# Patient Record
Sex: MALE | Race: WHITE | NOT HISPANIC OR LATINO | Employment: OTHER | URBAN - METROPOLITAN AREA
[De-identification: names, ages, dates, MRNs, and addresses within clinical notes are randomized per-mention and may not be internally consistent; named-entity substitution may affect disease eponyms.]

---

## 2023-06-27 LAB
CELLS COUNTED TOTAL (#) IN BODY FLUID: 100
CLARITY FLUID: NORMAL
COLOR OF BODY FLUID: NORMAL
ERYTHROCYTES (/UL) IN BODY FLUID: NORMAL /UL
JOINT FLUID CRYSTALS: NORMAL
LEUKOCYTES (/UL) IN BODY FLUID: 663 /UL
LYMPHOCYTES/100 LEUKOCYTES IN BODY FLUID BY MAN CT: 10 %
MONOCYTES+MACROPHAGES/100 WBC IN BODY FLUID BY MAN CT: 1 %
NEUTROPHILS/100 LEUKOCYTES IN BODY FLUID BY MANUAL COUNT: 89 %

## 2023-07-01 LAB
GRAM STAIN: NORMAL
STERILE FLUID CULTURE/SMEAR: NORMAL

## 2025-07-13 ENCOUNTER — APPOINTMENT (OUTPATIENT)
Dept: RADIOLOGY | Facility: HOSPITAL | Age: 85
DRG: 069 | End: 2025-07-13
Payer: MEDICARE

## 2025-07-13 ENCOUNTER — APPOINTMENT (OUTPATIENT)
Dept: CARDIOLOGY | Facility: HOSPITAL | Age: 85
DRG: 069 | End: 2025-07-13
Payer: MEDICARE

## 2025-07-13 ENCOUNTER — HOSPITAL ENCOUNTER (INPATIENT)
Facility: HOSPITAL | Age: 85
LOS: 5 days | Discharge: HOME | DRG: 069 | End: 2025-07-18
Attending: EMERGENCY MEDICINE | Admitting: INTERNAL MEDICINE
Payer: MEDICARE

## 2025-07-13 DIAGNOSIS — I63.89 OTHER CEREBRAL INFARCTION: ICD-10-CM

## 2025-07-13 DIAGNOSIS — I63.211 CEREBROVASCULAR ACCIDENT (CVA) DUE TO STENOSIS OF RIGHT VERTEBRAL ARTERY (MULTI): ICD-10-CM

## 2025-07-13 DIAGNOSIS — I16.9 HYPERTENSIVE CRISIS: ICD-10-CM

## 2025-07-13 DIAGNOSIS — I82.90 THROMBUS: ICD-10-CM

## 2025-07-13 DIAGNOSIS — I63.212: ICD-10-CM

## 2025-07-13 DIAGNOSIS — E87.6 HYPOKALEMIA: ICD-10-CM

## 2025-07-13 DIAGNOSIS — Z91.81 AT RISK FOR FALLING: ICD-10-CM

## 2025-07-13 DIAGNOSIS — G45.9 TIA (TRANSIENT ISCHEMIC ATTACK): Primary | ICD-10-CM

## 2025-07-13 DIAGNOSIS — R42 DIZZINESS AND GIDDINESS: ICD-10-CM

## 2025-07-13 DIAGNOSIS — I67.89 OTHER CEREBROVASCULAR DISEASE: ICD-10-CM

## 2025-07-13 PROBLEM — E78.2 MIXED HYPERLIPIDEMIA: Status: ACTIVE | Noted: 2017-10-06

## 2025-07-13 PROBLEM — E78.5 DYSLIPIDEMIA: Status: ACTIVE | Noted: 2025-07-13

## 2025-07-13 PROBLEM — K21.9 GERD (GASTROESOPHAGEAL REFLUX DISEASE): Status: ACTIVE | Noted: 2025-07-13

## 2025-07-13 PROBLEM — R09.89 LABILE HYPERTENSION: Status: ACTIVE | Noted: 2025-07-13

## 2025-07-13 PROBLEM — I77.71 DISSECTION OF CAROTID ARTERY (MULTI): Status: ACTIVE | Noted: 2025-07-13

## 2025-07-13 PROBLEM — R73.03 PREDIABETES: Status: ACTIVE | Noted: 2025-07-13

## 2025-07-13 PROBLEM — I10 HYPERTENSION: Status: ACTIVE | Noted: 2025-07-13

## 2025-07-13 PROBLEM — I82.409 DEEP VEIN THROMBOSIS (DVT) (MULTI): Status: ACTIVE | Noted: 2025-07-13

## 2025-07-13 LAB
ALBUMIN SERPL BCP-MCNC: 4.1 G/DL (ref 3.4–5)
ALBUMIN SERPL BCP-MCNC: 4.2 G/DL (ref 3.4–5)
ALP SERPL-CCNC: 48 U/L (ref 33–136)
ALP SERPL-CCNC: 50 U/L (ref 33–136)
ALT SERPL W P-5'-P-CCNC: 13 U/L (ref 10–52)
ALT SERPL W P-5'-P-CCNC: 13 U/L (ref 10–52)
ANION GAP SERPL CALC-SCNC: 13 MMOL/L (ref 10–20)
ANION GAP SERPL CALC-SCNC: 13 MMOL/L (ref 10–20)
APTT PPP: 36 SECONDS (ref 26–36)
AST SERPL W P-5'-P-CCNC: 15 U/L (ref 9–39)
AST SERPL W P-5'-P-CCNC: 16 U/L (ref 9–39)
BASOPHILS # BLD AUTO: 0.06 X10*3/UL (ref 0–0.1)
BASOPHILS NFR BLD AUTO: 0.8 %
BILIRUB SERPL-MCNC: 0.4 MG/DL (ref 0–1.2)
BILIRUB SERPL-MCNC: 0.5 MG/DL (ref 0–1.2)
BNP SERPL-MCNC: 75 PG/ML (ref 0–99)
BUN SERPL-MCNC: 22 MG/DL (ref 6–23)
BUN SERPL-MCNC: 23 MG/DL (ref 6–23)
CALCIUM SERPL-MCNC: 8.4 MG/DL (ref 8.6–10.3)
CALCIUM SERPL-MCNC: 8.5 MG/DL (ref 8.6–10.3)
CARDIAC TROPONIN I PNL SERPL HS: 17 NG/L (ref 0–20)
CHLORIDE SERPL-SCNC: 107 MMOL/L (ref 98–107)
CHLORIDE SERPL-SCNC: 107 MMOL/L (ref 98–107)
CHOLEST SERPL-MCNC: 144 MG/DL (ref 0–199)
CHOLESTEROL/HDL RATIO: 4.1
CO2 SERPL-SCNC: 25 MMOL/L (ref 21–32)
CO2 SERPL-SCNC: 25 MMOL/L (ref 21–32)
CREAT SERPL-MCNC: 1.12 MG/DL (ref 0.5–1.3)
CREAT SERPL-MCNC: 1.18 MG/DL (ref 0.5–1.3)
EGFRCR SERPLBLD CKD-EPI 2021: 61 ML/MIN/1.73M*2
EGFRCR SERPLBLD CKD-EPI 2021: 65 ML/MIN/1.73M*2
EOSINOPHIL # BLD AUTO: 0.31 X10*3/UL (ref 0–0.4)
EOSINOPHIL NFR BLD AUTO: 4 %
ERYTHROCYTE [DISTWIDTH] IN BLOOD BY AUTOMATED COUNT: 13 % (ref 11.5–14.5)
EST. AVERAGE GLUCOSE BLD GHB EST-MCNC: 134 MG/DL
GLUCOSE BLD MANUAL STRIP-MCNC: 113 MG/DL (ref 74–99)
GLUCOSE BLD MANUAL STRIP-MCNC: 130 MG/DL (ref 74–99)
GLUCOSE SERPL-MCNC: 110 MG/DL (ref 74–99)
GLUCOSE SERPL-MCNC: 136 MG/DL (ref 74–99)
HBA1C MFR BLD: 6.3 % (ref ?–5.7)
HCT VFR BLD AUTO: 43.2 % (ref 41–52)
HDLC SERPL-MCNC: 34.7 MG/DL
HGB BLD-MCNC: 14.1 G/DL (ref 13.5–17.5)
IMM GRANULOCYTES # BLD AUTO: 0.03 X10*3/UL (ref 0–0.5)
IMM GRANULOCYTES NFR BLD AUTO: 0.4 % (ref 0–0.9)
INR PPP: 2.2 (ref 0.9–1.1)
LDLC SERPL CALC-MCNC: 88 MG/DL
LYMPHOCYTES # BLD AUTO: 2.35 X10*3/UL (ref 0.8–3)
LYMPHOCYTES NFR BLD AUTO: 30.3 %
MCH RBC QN AUTO: 29.6 PG (ref 26–34)
MCHC RBC AUTO-ENTMCNC: 32.6 G/DL (ref 32–36)
MCV RBC AUTO: 91 FL (ref 80–100)
MONOCYTES # BLD AUTO: 0.84 X10*3/UL (ref 0.05–0.8)
MONOCYTES NFR BLD AUTO: 10.8 %
NEUTROPHILS # BLD AUTO: 4.17 X10*3/UL (ref 1.6–5.5)
NEUTROPHILS NFR BLD AUTO: 53.7 %
NON HDL CHOLESTEROL: 109 MG/DL (ref 0–149)
NRBC BLD-RTO: 0 /100 WBCS (ref 0–0)
PHOSPHATE SERPL-MCNC: 2.5 MG/DL (ref 2.5–4.9)
PLATELET # BLD AUTO: 203 X10*3/UL (ref 150–450)
POTASSIUM SERPL-SCNC: 3.3 MMOL/L (ref 3.5–5.3)
POTASSIUM SERPL-SCNC: 3.7 MMOL/L (ref 3.5–5.3)
PROT SERPL-MCNC: 6.6 G/DL (ref 6.4–8.2)
PROT SERPL-MCNC: 6.8 G/DL (ref 6.4–8.2)
PROTHROMBIN TIME: 24.8 SECONDS (ref 9.8–12.4)
RBC # BLD AUTO: 4.76 X10*6/UL (ref 4.5–5.9)
SODIUM SERPL-SCNC: 141 MMOL/L (ref 136–145)
SODIUM SERPL-SCNC: 142 MMOL/L (ref 136–145)
TRIGL SERPL-MCNC: 105 MG/DL (ref 0–149)
VLDL: 21 MG/DL (ref 0–40)
WBC # BLD AUTO: 7.8 X10*3/UL (ref 4.4–11.3)

## 2025-07-13 PROCEDURE — 2500000001 HC RX 250 WO HCPCS SELF ADMINISTERED DRUGS (ALT 637 FOR MEDICARE OP)

## 2025-07-13 PROCEDURE — 80053 COMPREHEN METABOLIC PANEL: CPT | Performed by: EMERGENCY MEDICINE

## 2025-07-13 PROCEDURE — 83880 ASSAY OF NATRIURETIC PEPTIDE: CPT

## 2025-07-13 PROCEDURE — 70496 CT ANGIOGRAPHY HEAD: CPT | Performed by: RADIOLOGY

## 2025-07-13 PROCEDURE — 85730 THROMBOPLASTIN TIME PARTIAL: CPT | Performed by: EMERGENCY MEDICINE

## 2025-07-13 PROCEDURE — 70450 CT HEAD/BRAIN W/O DYE: CPT

## 2025-07-13 PROCEDURE — 96374 THER/PROPH/DIAG INJ IV PUSH: CPT

## 2025-07-13 PROCEDURE — 2550000001 HC RX 255 CONTRASTS: Performed by: EMERGENCY MEDICINE

## 2025-07-13 PROCEDURE — 70498 CT ANGIOGRAPHY NECK: CPT

## 2025-07-13 PROCEDURE — 85025 COMPLETE CBC W/AUTO DIFF WBC: CPT | Performed by: EMERGENCY MEDICINE

## 2025-07-13 PROCEDURE — 93005 ELECTROCARDIOGRAM TRACING: CPT

## 2025-07-13 PROCEDURE — 2500000004 HC RX 250 GENERAL PHARMACY W/ HCPCS (ALT 636 FOR OP/ED): Mod: JW | Performed by: EMERGENCY MEDICINE

## 2025-07-13 PROCEDURE — 84484 ASSAY OF TROPONIN QUANT: CPT | Performed by: EMERGENCY MEDICINE

## 2025-07-13 PROCEDURE — 71045 X-RAY EXAM CHEST 1 VIEW: CPT

## 2025-07-13 PROCEDURE — 36415 COLL VENOUS BLD VENIPUNCTURE: CPT

## 2025-07-13 PROCEDURE — 70498 CT ANGIOGRAPHY NECK: CPT | Performed by: RADIOLOGY

## 2025-07-13 PROCEDURE — 82947 ASSAY GLUCOSE BLOOD QUANT: CPT

## 2025-07-13 PROCEDURE — 83036 HEMOGLOBIN GLYCOSYLATED A1C: CPT | Mod: STJLAB

## 2025-07-13 PROCEDURE — 70450 CT HEAD/BRAIN W/O DYE: CPT | Performed by: RADIOLOGY

## 2025-07-13 PROCEDURE — 99285 EMERGENCY DEPT VISIT HI MDM: CPT | Mod: 25 | Performed by: EMERGENCY MEDICINE

## 2025-07-13 PROCEDURE — 71045 X-RAY EXAM CHEST 1 VIEW: CPT | Performed by: RADIOLOGY

## 2025-07-13 PROCEDURE — 99291 CRITICAL CARE FIRST HOUR: CPT | Performed by: EMERGENCY MEDICINE

## 2025-07-13 PROCEDURE — 85610 PROTHROMBIN TIME: CPT | Performed by: EMERGENCY MEDICINE

## 2025-07-13 PROCEDURE — 84100 ASSAY OF PHOSPHORUS: CPT

## 2025-07-13 PROCEDURE — 80061 LIPID PANEL: CPT

## 2025-07-13 PROCEDURE — 2500000001 HC RX 250 WO HCPCS SELF ADMINISTERED DRUGS (ALT 637 FOR MEDICARE OP): Performed by: EMERGENCY MEDICINE

## 2025-07-13 PROCEDURE — 2500000002 HC RX 250 W HCPCS SELF ADMINISTERED DRUGS (ALT 637 FOR MEDICARE OP, ALT 636 FOR OP/ED)

## 2025-07-13 PROCEDURE — 36415 COLL VENOUS BLD VENIPUNCTURE: CPT | Performed by: EMERGENCY MEDICINE

## 2025-07-13 PROCEDURE — G0427 INPT/ED TELECONSULT70: HCPCS | Performed by: STUDENT IN AN ORGANIZED HEALTH CARE EDUCATION/TRAINING PROGRAM

## 2025-07-13 PROCEDURE — 2020000001 HC ICU ROOM DAILY

## 2025-07-13 PROCEDURE — 80053 COMPREHEN METABOLIC PANEL: CPT

## 2025-07-13 RX ORDER — POTASSIUM CHLORIDE 20 MEQ/1
20 TABLET, EXTENDED RELEASE ORAL ONCE
Status: DISCONTINUED | OUTPATIENT
Start: 2025-07-13 | End: 2025-07-13

## 2025-07-13 RX ORDER — ATORVASTATIN CALCIUM 80 MG/1
80 TABLET, FILM COATED ORAL NIGHTLY
Status: DISCONTINUED | OUTPATIENT
Start: 2025-07-13 | End: 2025-07-18 | Stop reason: HOSPADM

## 2025-07-13 RX ORDER — NAPROXEN SODIUM 220 MG/1
324 TABLET, FILM COATED ORAL ONCE
Status: COMPLETED | OUTPATIENT
Start: 2025-07-13 | End: 2025-07-13

## 2025-07-13 RX ORDER — CARVEDILOL 6.25 MG/1
6.25 TABLET ORAL 2 TIMES DAILY
COMMUNITY
End: 2025-07-13 | Stop reason: ENTERED-IN-ERROR

## 2025-07-13 RX ORDER — WARFARIN SODIUM 5 MG/1
2.5 TABLET ORAL
COMMUNITY
End: 2025-07-18 | Stop reason: HOSPADM

## 2025-07-13 RX ORDER — HYDRALAZINE HYDROCHLORIDE 25 MG/1
25 TABLET, FILM COATED ORAL 2 TIMES DAILY
COMMUNITY
End: 2025-07-22 | Stop reason: SDUPTHER

## 2025-07-13 RX ORDER — PRAVASTATIN SODIUM 20 MG/1
20 TABLET ORAL NIGHTLY
COMMUNITY
End: 2025-07-18 | Stop reason: HOSPADM

## 2025-07-13 RX ORDER — INSULIN LISPRO 100 [IU]/ML
0-5 INJECTION, SOLUTION INTRAVENOUS; SUBCUTANEOUS EVERY 4 HOURS
Status: DISCONTINUED | OUTPATIENT
Start: 2025-07-13 | End: 2025-07-17

## 2025-07-13 RX ORDER — WARFARIN SODIUM 5 MG/1
5 TABLET ORAL
COMMUNITY
End: 2025-07-18 | Stop reason: HOSPADM

## 2025-07-13 RX ORDER — ASPIRIN 81 MG/1
81 TABLET ORAL NIGHTLY
COMMUNITY

## 2025-07-13 RX ORDER — ASPIRIN 81 MG/1
81 TABLET ORAL NIGHTLY
Status: DISCONTINUED | OUTPATIENT
Start: 2025-07-14 | End: 2025-07-15

## 2025-07-13 RX ORDER — LABETALOL HYDROCHLORIDE 5 MG/ML
10 INJECTION, SOLUTION INTRAVENOUS EVERY 10 MIN PRN
Status: DISCONTINUED | OUTPATIENT
Start: 2025-07-13 | End: 2025-07-13 | Stop reason: SDUPTHER

## 2025-07-13 RX ORDER — POTASSIUM CHLORIDE 1.5 G/1.58G
20 POWDER, FOR SOLUTION ORAL ONCE
Status: COMPLETED | OUTPATIENT
Start: 2025-07-13 | End: 2025-07-13

## 2025-07-13 RX ORDER — ATORVASTATIN CALCIUM 80 MG/1
80 TABLET, FILM COATED ORAL NIGHTLY
Status: DISCONTINUED | OUTPATIENT
Start: 2025-07-13 | End: 2025-07-13

## 2025-07-13 RX ORDER — LABETALOL HYDROCHLORIDE 5 MG/ML
20 INJECTION, SOLUTION INTRAVENOUS ONCE
Status: DISCONTINUED | OUTPATIENT
Start: 2025-07-13 | End: 2025-07-13

## 2025-07-13 RX ORDER — POTASSIUM CHLORIDE 20 MEQ/1
40 TABLET, EXTENDED RELEASE ORAL ONCE
Status: COMPLETED | OUTPATIENT
Start: 2025-07-13 | End: 2025-07-13

## 2025-07-13 RX ORDER — LABETALOL HYDROCHLORIDE 5 MG/ML
10 INJECTION, SOLUTION INTRAVENOUS ONCE
Status: COMPLETED | OUTPATIENT
Start: 2025-07-13 | End: 2025-07-13

## 2025-07-13 RX ORDER — VALSARTAN 320 MG/1
320 TABLET ORAL NIGHTLY
COMMUNITY

## 2025-07-13 RX ORDER — LABETALOL HYDROCHLORIDE 5 MG/ML
10 INJECTION, SOLUTION INTRAVENOUS EVERY 10 MIN PRN
Status: ACTIVE | OUTPATIENT
Start: 2025-07-13 | End: 2025-07-15

## 2025-07-13 RX ADMIN — POTASSIUM CHLORIDE 20 MEQ: 1.5 POWDER, FOR SOLUTION ORAL at 18:42

## 2025-07-13 RX ADMIN — ASPIRIN 81 MG CHEWABLE TABLET 324 MG: 81 TABLET CHEWABLE at 18:42

## 2025-07-13 RX ADMIN — ATORVASTATIN CALCIUM 80 MG: 80 TABLET, FILM COATED ORAL at 21:04

## 2025-07-13 RX ADMIN — LABETALOL HYDROCHLORIDE 10 MG: 5 INJECTION, SOLUTION INTRAVENOUS at 18:32

## 2025-07-13 RX ADMIN — POTASSIUM CHLORIDE EXTENDED-RELEASE 40 MEQ: 1500 TABLET ORAL at 21:04

## 2025-07-13 RX ADMIN — IOHEXOL 60 ML: 350 INJECTION, SOLUTION INTRAVENOUS at 17:38

## 2025-07-13 SDOH — ECONOMIC STABILITY: FOOD INSECURITY: WITHIN THE PAST 12 MONTHS, YOU WORRIED THAT YOUR FOOD WOULD RUN OUT BEFORE YOU GOT THE MONEY TO BUY MORE.: NEVER TRUE

## 2025-07-13 SDOH — ECONOMIC STABILITY: FOOD INSECURITY: WITHIN THE PAST 12 MONTHS, THE FOOD YOU BOUGHT JUST DIDN'T LAST AND YOU DIDN'T HAVE MONEY TO GET MORE.: NEVER TRUE

## 2025-07-13 SDOH — ECONOMIC STABILITY: FOOD INSECURITY: HOW HARD IS IT FOR YOU TO PAY FOR THE VERY BASICS LIKE FOOD, HOUSING, MEDICAL CARE, AND HEATING?: NOT HARD AT ALL

## 2025-07-13 SDOH — ECONOMIC STABILITY: HOUSING INSECURITY: AT ANY TIME IN THE PAST 12 MONTHS, WERE YOU HOMELESS OR LIVING IN A SHELTER (INCLUDING NOW)?: NO

## 2025-07-13 SDOH — ECONOMIC STABILITY: INCOME INSECURITY: IN THE PAST 12 MONTHS HAS THE ELECTRIC, GAS, OIL, OR WATER COMPANY THREATENED TO SHUT OFF SERVICES IN YOUR HOME?: NO

## 2025-07-13 SDOH — ECONOMIC STABILITY: HOUSING INSECURITY: IN THE PAST 12 MONTHS, HOW MANY TIMES HAVE YOU MOVED WHERE YOU WERE LIVING?: 0

## 2025-07-13 SDOH — SOCIAL STABILITY: SOCIAL INSECURITY: WITHIN THE LAST YEAR, HAVE YOU BEEN AFRAID OF YOUR PARTNER OR EX-PARTNER?: NO

## 2025-07-13 SDOH — SOCIAL STABILITY: SOCIAL INSECURITY: DO YOU FEEL UNSAFE GOING BACK TO THE PLACE WHERE YOU ARE LIVING?: NO

## 2025-07-13 SDOH — SOCIAL STABILITY: SOCIAL INSECURITY: ABUSE: ADULT

## 2025-07-13 SDOH — SOCIAL STABILITY: SOCIAL INSECURITY: HAVE YOU HAD THOUGHTS OF HARMING ANYONE ELSE?: NO

## 2025-07-13 SDOH — ECONOMIC STABILITY: HOUSING INSECURITY: IN THE LAST 12 MONTHS, WAS THERE A TIME WHEN YOU WERE NOT ABLE TO PAY THE MORTGAGE OR RENT ON TIME?: NO

## 2025-07-13 SDOH — SOCIAL STABILITY: SOCIAL INSECURITY: WITHIN THE LAST YEAR, HAVE YOU BEEN HUMILIATED OR EMOTIONALLY ABUSED IN OTHER WAYS BY YOUR PARTNER OR EX-PARTNER?: NO

## 2025-07-13 SDOH — SOCIAL STABILITY: SOCIAL INSECURITY: HAS ANYONE EVER THREATENED TO HURT YOUR FAMILY OR YOUR PETS?: NO

## 2025-07-13 SDOH — SOCIAL STABILITY: SOCIAL INSECURITY
WITHIN THE LAST YEAR, HAVE YOU BEEN KICKED, HIT, SLAPPED, OR OTHERWISE PHYSICALLY HURT BY YOUR PARTNER OR EX-PARTNER?: NO

## 2025-07-13 SDOH — SOCIAL STABILITY: SOCIAL INSECURITY
WITHIN THE LAST YEAR, HAVE YOU BEEN RAPED OR FORCED TO HAVE ANY KIND OF SEXUAL ACTIVITY BY YOUR PARTNER OR EX-PARTNER?: NO

## 2025-07-13 SDOH — SOCIAL STABILITY: SOCIAL INSECURITY: DO YOU FEEL ANYONE HAS EXPLOITED OR TAKEN ADVANTAGE OF YOU FINANCIALLY OR OF YOUR PERSONAL PROPERTY?: NO

## 2025-07-13 SDOH — ECONOMIC STABILITY: TRANSPORTATION INSECURITY: IN THE PAST 12 MONTHS, HAS LACK OF TRANSPORTATION KEPT YOU FROM MEDICAL APPOINTMENTS OR FROM GETTING MEDICATIONS?: NO

## 2025-07-13 SDOH — SOCIAL STABILITY: SOCIAL INSECURITY: DOES ANYONE TRY TO KEEP YOU FROM HAVING/CONTACTING OTHER FRIENDS OR DOING THINGS OUTSIDE YOUR HOME?: NO

## 2025-07-13 SDOH — SOCIAL STABILITY: SOCIAL INSECURITY: ARE THERE ANY APPARENT SIGNS OF INJURIES/BEHAVIORS THAT COULD BE RELATED TO ABUSE/NEGLECT?: NO

## 2025-07-13 SDOH — SOCIAL STABILITY: SOCIAL INSECURITY: ARE YOU OR HAVE YOU BEEN THREATENED OR ABUSED PHYSICALLY, EMOTIONALLY, OR SEXUALLY BY ANYONE?: NO

## 2025-07-13 ASSESSMENT — ACTIVITIES OF DAILY LIVING (ADL)
TOILETING: INDEPENDENT
HEARING - LEFT EAR: HEARING AID
BATHING: INDEPENDENT
JUDGMENT_ADEQUATE_SAFELY_COMPLETE_DAILY_ACTIVITIES: YES
FEEDING YOURSELF: INDEPENDENT
HEARING - RIGHT EAR: HEARING AID
WALKS IN HOME: INDEPENDENT
LACK_OF_TRANSPORTATION: NO
ADEQUATE_TO_COMPLETE_ADL: YES
GROOMING: INDEPENDENT
DRESSING YOURSELF: INDEPENDENT
PATIENT'S MEMORY ADEQUATE TO SAFELY COMPLETE DAILY ACTIVITIES?: YES

## 2025-07-13 ASSESSMENT — LIFESTYLE VARIABLES
SKIP TO QUESTIONS 9-10: 1
AUDIT-C TOTAL SCORE: 1
HOW OFTEN DO YOU HAVE 6 OR MORE DRINKS ON ONE OCCASION: NEVER
HOW OFTEN DO YOU HAVE A DRINK CONTAINING ALCOHOL: MONTHLY OR LESS
HOW MANY STANDARD DRINKS CONTAINING ALCOHOL DO YOU HAVE ON A TYPICAL DAY: 1 OR 2
AUDIT-C TOTAL SCORE: 1

## 2025-07-13 ASSESSMENT — PAIN SCALES - GENERAL
PAINLEVEL_OUTOF10: 0 - NO PAIN

## 2025-07-13 ASSESSMENT — COGNITIVE AND FUNCTIONAL STATUS - GENERAL
WALKING IN HOSPITAL ROOM: A LITTLE
TOILETING: A LITTLE
CLIMB 3 TO 5 STEPS WITH RAILING: A LOT
PATIENT BASELINE BEDBOUND: NO
STANDING UP FROM CHAIR USING ARMS: A LITTLE
DAILY ACTIVITIY SCORE: 23
MOBILITY SCORE: 20

## 2025-07-13 ASSESSMENT — PAIN - FUNCTIONAL ASSESSMENT
PAIN_FUNCTIONAL_ASSESSMENT: 0-10
PAIN_FUNCTIONAL_ASSESSMENT: 0-10

## 2025-07-13 ASSESSMENT — PATIENT HEALTH QUESTIONNAIRE - PHQ9
SUM OF ALL RESPONSES TO PHQ9 QUESTIONS 1 & 2: 0
1. LITTLE INTEREST OR PLEASURE IN DOING THINGS: NOT AT ALL
2. FEELING DOWN, DEPRESSED OR HOPELESS: NOT AT ALL

## 2025-07-13 NOTE — CONSULTS
"Inpatient consult to Neuro TeleStroke  Consult performed by: Julito Hu MD  Consult ordered by: Lukas Brown DO      Virtual Visit start time: 550 pm    History Of Present Illness:  Historian: Patient, Family, and ED Provider   Radha Boone is a 84 y.o. male presenting with right arm tingling.  Wife reports that he came to her and said he does not feel good and right arm is tingling.  When she checked his blood pressure it was 230s, came to ED for further evaluation.  Last known well: 4:30 PM  Had stroke symptoms resolved at time of presentation: No   Current antiplatelet/anticoagulant use: Coumadin    Prior Functional Status (Modified Laureano Scale):  1 The patient has no significant disability; able to carry out all pre-stroke activities.    Stroke Risk Factors:  Hypertension, Hyperlipidemia, and Previous H/O Ischemic Stroke    Last Recorded Vitals:  Blood pressure (!) 229/104, pulse 69, temperature 36.8 °C (98.2 °F), temperature source Temporal, resp. rate 16, height 1.803 m (5' 11\"), weight 79.5 kg (175 lb 4.3 oz), SpO2 98%.    Physical Exam:  Alert, oriented.  Normal language, mild dysarthria.  Intact peripheral visual fields.  Symmetric face.  Subtle drift in left upper and lower extremities with ataxia    [facility] NIHSS:   NIH Stroke Scale:     1A. Level of Consciousness:  Alert (keenly responsive) (0)    1B. Ask Month and Age:  Both questions right (0)    1C. Blink Eyes & Squeeze Hands:  Performs both tasks (0)    2. Best Gaze:  Normal (0)    3. Visual:  No visual loss (0)    4. Facial Palsy:  Normal symmetry (0)    5A. Motor - Left Arm:  Drift (+1)    5B. Motor - Right Arm:  No drift (0)    6A. Motor - Left Leg:  No drift (0)    6B. Motor - Right Leg:  No drift (0)    7. Limb Ataxia:  Ataxia in 1 limb (+1)    8. Sensory Loss:  Mild-moderate loss (+1)    9. Best Language:  Normal (no aphasia) (0)    10. Dysarthia:  Mild-moderate dysarthria (+1)    11. Extinction and Inattention:  No abnormality (0)    " "NIH Stroke Scale:  4       Relevant Results:  LABS:  No results found for: \"GLUCOSE\", \"INR\"   No results found for this or any previous visit (from the past 24 hours).     CT Head Imaging:  CTH imaging personally reviewed, showed no acute ischemic / hemorrhagic changes     CTA Head and Neck Imaging:  CTA imaging personally reviewed, other findings  High-grade stenosis of right V4 vertebral artery, moderate focal stenosis of mid basilar artery.  Atherosclerosis of bilateral carotid bifurcations without significant stenosis    Diagnosis:  Supect Acute Ischemic Stroke    Assessment/Plan:  84-year-old gentleman with history of?  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  Suspect posterior circulation stroke    IV Thrombolysis IV Thrombolysis Checklist        IV Thrombolysis Given: No; Thrombolysis contraindication reason: Coagulopathy with Platelets <100,000/mm3 OR aPTT >40s OR PT>15s OR INR>1.7            Patient is a candidate for thrombectomy:  yes/no: No; contraindication reason: No evidence of proximal occlusion    Additional Recommendations:  MRI Brain w/o contrast stroke protocol or repeat CTH 24 hours after initial CTH if unable to get MRI.  TTE w/ bubble study.  Please obtain extended cardiac rhythm monitoring with Holter to rule out paroxysmal A fib.  Lipid panel, A1c.  Hold Coumadin until MRI brain without contrast.  It is unclear why patient is on long-term Coumadin,.  Usually short-term anticoagulation is considered for carotid artery thrombus. .  Aspirin 81 Mg.  Lipid Goals: education on healthy diet and statin therapy to maintain or achieve goal LDL-cholesterol < 70mg. Atorvastatin 80mg..  Blood pressure goals: avoid hypotension SBP <100 that could worsen cerebral perfusion. Ischemic stroke- early permissive hypertension SBP < 220 mmHg with cautious inpatient lowering..  Glucose Goals: " early treatment of hyperglycemia to goal glucose 140-180 mg/dl with long-term goal A1c < 7%.  NPO until nurse bedside swallow assessment.  Consider focused stroke order set.  Smoking Cessation and Education.  Assessment for Rehabilitation needs.  Patient and family education on signs and symptoms of stroke, calling 911, healthy strategies for stroke prevention.        Disposition:  Patient will remain at referring facility for further evaluation and management.    Virtual or Telephone Consent    An interactive audio and video telecommunication system which permits real time communications between the patient (at the originating site) and provider (at the distant site) was utilized to provide this telehealth service.   Verbal consent was requested and obtained from Radha Boone on this date, 07/14/25 for a telehealth visit.      Telestroke is covered in shift work. If there are further Neurological questions or concerns please contact your regional neurologist on call during daytime hours or contact the transfer center with an ADT20 order.    Julito Hu MD

## 2025-07-13 NOTE — ED NOTES
1727 Stroke alert one push activation by ANTHONY Brown  Emergency overhead     Kerline Diaz, EMT  07/13/25 1730

## 2025-07-13 NOTE — ED PROVIDER NOTES
EMERGENCY DEPARTMENT ENCOUNTER      Pt Name: Radha Boone  MRN: [id number]  Birthdate 1940  Date of evaluation: 7/13/2025  Provider: Lukas Brown DO    CHIEF COMPLAINT       Chief Complaint   Patient presents with    Extremity Weakness     HISTORY OF PRESENT ILLNESS    Radha Boone is a 84 y.o. year old male who presents to the ER for lightheadedness.  Wife reports that they were watching the trees, he did not feel well so he went back into their home. She does note he did have an abnormal gait. He reported right upper extremity paresthesias, discomfort in the back of his head, started about 1630. Denies chest pain, denies abdominal pain, vomiting, falls.     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      pravastatin (Pravachol) 20 mg tablet Take 1 tablet (20 mg) by mouth once daily at bedtime.      valsartan (Diovan) 320 mg tablet Take 1 tablet (320 mg) by mouth once daily at bedtime.      vit C/E/Zn/coppr/lutein/zeaxan (PRESERVISION AREDS-2 ORAL) Take 1 capsule by mouth 2 times a day.      !! warfarin (Coumadin) 5 mg tablet Take 0.5 tablets (2.5 mg) by mouth once a day on Sunday, Tuesday, Thursday, and Saturday. evening      aspirin 81 mg EC tablet Take 1 tablet (81 mg) by mouth once daily at bedtime.      !! warfarin (Coumadin) 5 mg tablet Take 1 tablet (5 mg) by mouth once a day on Monday, Wednesday, and Friday. evening       !! - Potential duplicate medications found. Please discuss with provider.        SURGICAL HISTORY     Surgical History[2]  ALLERGIES     Amlodipine and Epinephrine-chlorpheniramine  FAMILY HISTORY     Family History[3]  SOCIAL  HISTORY     Social History[4]  PHYSICAL EXAM  (up to 7 for level 4, 8 or more for level 5)     ED Triage Vitals   Temp Pulse Resp BP   -- -- -- --      SpO2 Temp src Heart Rate Source Patient Position   -- -- -- --      BP Location FiO2 (%)     -- --       Physical Exam  Vitals and nursing note reviewed.   Constitutional:       General: He is not in acute distress.     Appearance: Normal appearance. He is not ill-appearing.   HENT:      Head: Normocephalic and atraumatic. No raccoon eyes, Markham's sign, contusion or laceration.      Jaw: No trismus or malocclusion.      Right Ear: External ear normal.      Left Ear: External ear normal.      Mouth/Throat:      Mouth: Mucous membranes are moist.      Pharynx: Oropharynx is clear.   Eyes:      General: No visual field deficit.     Extraocular Movements: Extraocular movements intact.      Pupils: Pupils are equal, round, and reactive to light.   Neck:      Trachea: No tracheal deviation.   Cardiovascular:      Rate and Rhythm: Normal rate and regular rhythm.      Pulses: Normal pulses.   Pulmonary:      Effort: No respiratory distress.      Breath sounds: No wheezing, rhonchi or rales.   Chest:      Chest wall: No tenderness.   Abdominal:      General: Abdomen is flat.      Palpations: Abdomen is soft. There is no mass.      Tenderness: There is abdominal tenderness (LLQ).   Musculoskeletal:         General: No tenderness or signs of injury.      Right lower leg: No edema.      Left lower leg: No edema.   Skin:     Coloration: Skin is not jaundiced or pale.      Findings: No petechiae, rash or wound.   Neurological:      Mental Status: He is alert.      GCS: GCS eye subscore is 4. GCS verbal subscore is 5. GCS motor subscore is 6.      Cranial Nerves: Dysarthria present.      Sensory: Sensation is intact. No sensory deficit.      Motor: Weakness (RLE drift without hit bed) present.        DIAGNOSTIC RESULTS   LABS:  Labs Reviewed   CBC WITH AUTO DIFFERENTIAL - Abnormal        Result Value    WBC 7.8      nRBC 0.0      RBC 4.76      Hemoglobin 14.1      Hematocrit 43.2      MCV 91      MCH 29.6      MCHC 32.6      RDW 13.0      Platelets 203      Neutrophils % 53.7      Immature Granulocytes %, Automated 0.4      Lymphocytes % 30.3      Monocytes % 10.8      Eosinophils % 4.0      Basophils % 0.8      Neutrophils Absolute 4.17      Immature Granulocytes Absolute, Automated 0.03      Lymphocytes Absolute 2.35      Monocytes Absolute 0.84 (*)     Eosinophils Absolute 0.31      Basophils Absolute 0.06     COMPREHENSIVE METABOLIC PANEL - Abnormal    Glucose 110 (*)     Sodium 142      Potassium 3.3 (*)     Chloride 107      Bicarbonate 25      Anion Gap 13      Urea Nitrogen 23      Creatinine 1.18      eGFR 61      Calcium 8.5 (*)     Albumin 4.2      Alkaline Phosphatase 50      Total Protein 6.8      AST 16      Bilirubin, Total 0.4      ALT 13     PROTIME-INR - Abnormal    Protime 24.8 (*)     INR 2.2 (*)    HEMOGLOBIN A1C - Abnormal    Hemoglobin A1C 6.3 (*)     Estimated Average Glucose 134      Narrative:     Diagnosis of Diabetes-Adults  Non-Diabetic: < or = 5.6%  Increased risk for developing diabetes: 5.7-6.4%  Diagnostic of diabetes: > or = 6.5%       COMPREHENSIVE METABOLIC PANEL - Abnormal    Glucose 136 (*)     Sodium 141      Potassium 3.7      Chloride 107      Bicarbonate 25      Anion Gap 13      Urea Nitrogen 22      Creatinine 1.12      eGFR 65      Calcium 8.4 (*)     Albumin 4.1      Alkaline Phosphatase 48      Total Protein 6.6      AST 15      Bilirubin, Total 0.5      ALT 13     POCT GLUCOSE - Abnormal    POCT Glucose 130 (*)    POCT GLUCOSE - Abnormal    POCT Glucose 113 (*)    TROPONIN I, HIGH SENSITIVITY - Normal    Troponin I, High Sensitivity 17      Narrative:     Less than 99th percentile of normal range cutoff-  Female and children under 18 years old <14 ng/L; Male <21 ng/L: Negative  Repeat testing should be performed if clinically indicated.      Female and children under 18 years old 14-50 ng/L; Male 21-50 ng/L:  Consistent with possible cardiac damage and possible increased clinical   risk. Serial measurements may help to assess extent of myocardial damage.     >50 ng/L: Consistent with cardiac damage, increased clinical risk and  myocardial infarction. Serial measurements may help assess extent of   myocardial damage.      NOTE: Children less than 1 year old may have higher baseline troponin   levels and results should be interpreted in conjunction with the overall   clinical context.     NOTE: Troponin I testing is performed using a different   testing methodology at [facility] than at other   [facility]. Direct result comparisons should only   be made within the same method.   APTT - Normal    aPTT 36      Narrative:     The APTT is no longer used for monitoring Unfractionated Heparin Therapy. For monitoring Heparin Therapy, use the Heparin Assay.   B-TYPE NATRIURETIC PEPTIDE - Normal    BNP 75      Narrative:        <100 pg/mL - Heart failure unlikely  100-299 pg/mL - Intermediate probability of acute heart                  failure exacerbation. Correlate with clinical                  context and patient history.    >=300 pg/mL - Heart Failure likely. Correlate with clinical                  context and patient history.    BNP testing is performed using different testing methodology at [facility] than at other [facility]. Direct result comparisons should only be made within the same method.      PHOSPHORUS - Normal    Phosphorus 2.5     LIPID PANEL    Cholesterol 144      HDL-Cholesterol 34.7      Cholesterol/HDL Ratio 4.1      LDL Calculated 88      VLDL 21      Triglycerides 105      Non HDL Cholesterol 109     CBC WITH AUTO DIFFERENTIAL   COMPREHENSIVE METABOLIC PANEL   MAGNESIUM   PHOSPHORUS   POCT GLUCOSE METER   POCT GLUCOSE METER   POCT GLUCOSE METER   POCT GLUCOSE METER   POCT GLUCOSE METER   POCT  GLUCOSE METER     All other labs were within normal range or not returned as of this dictation.  Imaging  XR chest 1 view   Final Result   1.  Bibasilar patchy airspace disease worse on the left with   atelectasis and pneumonia in the differential.  Radiographic   follow-up to resolution in 2-3 weeks is recommended.                  MACRO:   None        Signed by: Jameson Esposito 7/13/2025 6:55 PM   Dictation workstation:   [id number]      CT brain attack angio head and neck W and WO IV contrast   Final Result   High-grade/marked stenosis more likely than focal occlusion of the   right V4 vertebral artery. Moderate focal stenosis of the mid to   distal basilar artery. MRI would have greater sensitivity and   specificity for recent ischemia if clinically suspected.        Flattening of the medial contour of the left common carotid artery   over greater than 4 cm segment could be on the basis of soft   atherosclerotic plaque or perhaps underlying dissection in this   region.        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             MACRO:        Jimmie Santana discussed the significance and urgency of this critical   finding by Event[employer]ist chat with  JOE GUILLAUME on 7/13/2025 at 6:05   pm.  (**-RCF-**) Findings:  See findings.        Signed by: Jimmie Santana 7/13/2025 6:05 PM   Dictation workstation:   [id number]      CT brain attack head wo IV contrast   Final Result   No acute intracranial hemorrhage or mass-effect.        Small possible mucous retention cyst or polyp in the right maxillary   sinus.        MACRO:   Gracia Zepeda discussed the significance and urgency of this critical   finding by Event[employer]ist chat with  JOE GUILLAUME on 7/13/2025 at 5:41   pm.  (**-RCF-**) Findings:  See findings.              Signed by: Gracia Zepeda 7/13/2025 5:43 PM   Dictation workstation:   [id number]      MR brain wo IV contrast    (Results Pending)   MR angio head wo IV contrast    (Results Pending)   MR angio neck wo IV contrast    (Results Pending)   Transthoracic Echo Complete    (Results Pending)      Procedure  Procedures  EMERGENCY DEPARTMENT COURSE/MDM:   Medical Decision Making    Vitals:    Vitals:    07/13/25 2300 07/14/25 0000 07/14/25 0100 07/14/25 0200   BP: (!) 181/89 (!) 173/95 159/86 161/85   Pulse: 64 70 68 60   Resp: 15 24 18 15   Temp:  36.5 °C (97.7 °F)     TempSrc:  Temporal     SpO2: 95% 94% 96% 95%   Weight:       Height:         Radha Boone is a male 84 y.o. who presents to the ER for lightheadedness. On arrival the patients vital signs were: Afebrile, Regular HR, Hypertensive, Regular RR, and Normoxic on room air. History obtained from: patient and Spouse. On arrival euglycemic, VAN negative, however given extreme hypertension while on anticoagulation, new neurodeficits, brain attack called, CTA head/neck ordered as well to assess for internal vascular or aneurysmal disease. No chest pain or dyspnea, doubt aortic dissection, CTA chest/abdomen/pelvis deferred.     ED Course as of 07/14/25 0251   Sun Jul 13, 2025 1746 Discussed with Dr. Hu, neuro stroke, agrees to see the patient.  [CB]   1753 CT brain attack head wo IV contrast  No acute intracranial hemorrhage or mass-effect.      Small possible mucous retention cyst or polyp in the right maxillary  sinus.   [CB]   1802 12 lead EKG per my interpretation:    Rate: 69  Rhythm: Normal sinus rhythm  Axis: Normal  Qtc: Normal  ST segments/T-waves: T wave inversions in lead I and aVL  New T wave inversions   [BR]   1804 Radiology called, informed me CTA shows stenosis of R V4, flattening of left common carotid concerning for dissection, and 0.3cm aneurysm vs infundibulum of R supraclinoid internal carotid artery [CB]   1827 INR(!): 2.2  Therapeutic INR  [CB]   1827 CBC and Auto Differential(!)  No acute leukocytosis, leukopenia, thrombocytosis, thrombocytopenia, or anemia [CB]   1827 Comprehensive metabolic panel(!)  Slight hypokalemia, otherwise normoglycemic, no acute electrolyte or hepatorenal abnormality [CB]   1827 Troponin I, High Sensitivity: 17  Not elevated doubt acs or myopericarditis [CB]   1828 CT brain attack angio head and neck W and WO IV contrast  High-grade/marked stenosis more likely than focal occlusion of the  right V4 vertebral artery. Moderate focal stenosis of the mid to  distal basilar artery. MRI would have greater sensitivity and  specificity for recent ischemia if clinically suspected.      Flattening of the medial contour of the left common carotid artery  over greater than 4 cm segment could be on the basis of soft  atherosclerotic plaque or perhaps underlying dissection in this  region.      Prominent atherosclerotic changes of the carotid bifurcations more on  the right side without however hemodynamically significant stenosis  by NASCET criteria      0.3 cm aneurysm or infundibulum of the right supraclinoid internal  carotid artery.       [CB]   1828 Neurology, Dr. Hu, recommending MRI brain, no TNK, hold Coumadin, give aspirin, gradual reduction of BP targets <220 [CB]   1907 XR chest 1 view  1.  Bibasilar patchy airspace disease worse on the left with  atelectasis and pneumonia in the differential.  Radiographic  follow-up to resolution in 2-3 weeks is recommended.   [CB]   1907 Dr. Barragan recommending ICU, may need close monitoring [CB]   1919 Dr. Walter will accept to ICU for Q1NC [CB]      ED Course User Index  [BR] Michelle Franco MD  [CB] Lukas Brown,          Diagnoses as of 07/14/25 0251   Cerebrovascular accident (CVA) due to stenosis of right vertebral artery (Multi)   Hypertensive crisis   Hypokalemia     External Records Reviewed: I reviewed recent and relevant outside records including inpatient notes,  outpatient records    Shared decision making for disposition  Patient and/or patient´s representative was counseled regarding labs, imaging, likely diagnosis. All questions were answered. Recommendation was made   for Admission given the need for further escalation of care to inpatient management. Patient agreed and was admitted in stable condition. Admitting team was notified of any pending labs or imaging to ensure continuity of care.     ED Medications administered this visit:    Medications   aspirin EC tablet 81 mg (has no administration in time range)   oxygen (O2) therapy (has no administration in time range)   labetaloL (Normodyne,Trandate) injection 10 mg (has no administration in time range)   atorvastatin (Lipitor) tablet 80 mg (80 mg oral Given 7/13/25 2104)   insulin lispro injection 0-5 Units ( subcutaneous Not Given 7/14/25 0100)   iohexol (OMNIPaque) 350 mg iodine/mL solution 60 mL (60 mL intravenous Given 7/13/25 1738)   labetaloL (Normodyne,Trandate) injection 10 mg (10 mg intravenous Given 7/13/25 1832)   aspirin chewable tablet 324 mg (324 mg oral Given 7/13/25 1842)   potassium chloride (Klor-Con) packet 20 mEq (20 mEq oral Given 7/13/25 1842)   potassium chloride CR (Klor-Con M20) ER tablet 40 mEq (40 mEq oral Given 7/13/25 2104)        Final Impression:   1. Cerebrovascular accident (CVA) due to stenosis of right vertebral artery (Multi)    2. Hypertensive crisis    3. Hypokalemia    4. Other cerebrovascular disease          Please excuse any misspellings or unintended errors related to the Dragon speech recognition software used to dictate this note.    I reviewed the case with the attending ED physician. The attending ED physician agrees with the plan.          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    Eructation    Flatulence 04/24/2015    Flatulence symptom    Hypertension     Orthostatic hypotension 03/15/2019    Primary orthostatic hypotension    Other abnormal glucose 10/21/2014    Abnormal glucose    Personal history of other diseases of the nervous system and sense organs 10/21/2014    History of glaucoma    Personal history of other endocrine, nutritional and metabolic disease     History of hypercholesterolemia    Personal history of transient ischemic attack (TIA), and cerebral infarction without residual deficits 03/15/2019    History of stroke    Stroke (Multi)     Transient global amnesia 12/04/2013    Transient global amnesia   [2]   Past Surgical History:  Procedure Laterality Date    CATARACT EXTRACTION  07/27/2016    Cataract Surgery    COLONOSCOPY  07/16/2019    Complete Colonoscopy    COLONOSCOPY  07/16/2019    Complete Colonoscopy    HERNIA REPAIR  03/27/2014    Hernia Repair    OTHER SURGICAL HISTORY  07/16/2019    Endoscopy   [3] No family history on file.  [4]   Social History  Tobacco Use    Smoking status: Never    Smokeless tobacco: Never   Vaping Use    Vaping status: Never Used   Substance Use Topics    Alcohol use: Not Currently    Drug use: Never        Lukas Brown DO  Resident  07/14/25 0253

## 2025-07-14 ENCOUNTER — APPOINTMENT (OUTPATIENT)
Dept: RADIOLOGY | Facility: HOSPITAL | Age: 85
DRG: 069 | End: 2025-07-14
Payer: MEDICARE

## 2025-07-14 ENCOUNTER — APPOINTMENT (OUTPATIENT)
Dept: CARDIOLOGY | Facility: HOSPITAL | Age: 85
DRG: 069 | End: 2025-07-14
Payer: MEDICARE

## 2025-07-14 PROBLEM — R55 SYNCOPE: Status: ACTIVE | Noted: 2025-07-14

## 2025-07-14 PROBLEM — I82.90 THROMBUS: Status: ACTIVE | Noted: 2017-05-18

## 2025-07-14 LAB
ALBUMIN SERPL BCP-MCNC: 3.8 G/DL (ref 3.4–5)
ALP SERPL-CCNC: 46 U/L (ref 33–136)
ALT SERPL W P-5'-P-CCNC: 11 U/L (ref 10–52)
ANION GAP SERPL CALC-SCNC: 11 MMOL/L (ref 10–20)
AORTIC VALVE PEAK VELOCITY: 1.44 M/S
AST SERPL W P-5'-P-CCNC: 13 U/L (ref 9–39)
ATRIAL RATE: 69 BPM
AV PEAK GRADIENT: 8 MMHG
AVA (PEAK VEL): 2.09 CM2
BASOPHILS # BLD AUTO: 0.05 X10*3/UL (ref 0–0.1)
BASOPHILS NFR BLD AUTO: 0.6 %
BILIRUB SERPL-MCNC: 0.6 MG/DL (ref 0–1.2)
BUN SERPL-MCNC: 21 MG/DL (ref 6–23)
CALCIUM SERPL-MCNC: 8.4 MG/DL (ref 8.6–10.3)
CHLORIDE SERPL-SCNC: 108 MMOL/L (ref 98–107)
CO2 SERPL-SCNC: 26 MMOL/L (ref 21–32)
CREAT SERPL-MCNC: 1.04 MG/DL (ref 0.5–1.3)
EGFRCR SERPLBLD CKD-EPI 2021: 71 ML/MIN/1.73M*2
EJECTION FRACTION APICAL 4 CHAMBER: 47
EJECTION FRACTION: 62 %
EOSINOPHIL # BLD AUTO: 0.19 X10*3/UL (ref 0–0.4)
EOSINOPHIL NFR BLD AUTO: 2.4 %
ERYTHROCYTE [DISTWIDTH] IN BLOOD BY AUTOMATED COUNT: 13.1 % (ref 11.5–14.5)
GLUCOSE BLD MANUAL STRIP-MCNC: 110 MG/DL (ref 74–99)
GLUCOSE BLD MANUAL STRIP-MCNC: 131 MG/DL (ref 74–99)
GLUCOSE BLD MANUAL STRIP-MCNC: 132 MG/DL (ref 74–99)
GLUCOSE BLD MANUAL STRIP-MCNC: 133 MG/DL (ref 74–99)
GLUCOSE BLD MANUAL STRIP-MCNC: 171 MG/DL (ref 74–99)
GLUCOSE SERPL-MCNC: 107 MG/DL (ref 74–99)
HCT VFR BLD AUTO: 40.6 % (ref 41–52)
HGB BLD-MCNC: 13.2 G/DL (ref 13.5–17.5)
IMM GRANULOCYTES # BLD AUTO: 0.03 X10*3/UL (ref 0–0.5)
IMM GRANULOCYTES NFR BLD AUTO: 0.4 % (ref 0–0.9)
INR PPP: 2.2 (ref 0.9–1.1)
LEFT ATRIUM VOLUME AREA LENGTH INDEX BSA: 36.5 ML/M2
LEFT VENTRICLE INTERNAL DIMENSION DIASTOLE: 4.47 CM (ref 3.5–6)
LEFT VENTRICULAR OUTFLOW TRACT DIAMETER: 2.01 CM
LV EJECTION FRACTION BIPLANE: 62 %
LYMPHOCYTES # BLD AUTO: 2.18 X10*3/UL (ref 0.8–3)
LYMPHOCYTES NFR BLD AUTO: 27.2 %
MAGNESIUM SERPL-MCNC: 2.03 MG/DL (ref 1.6–2.4)
MCH RBC QN AUTO: 29.5 PG (ref 26–34)
MCHC RBC AUTO-ENTMCNC: 32.5 G/DL (ref 32–36)
MCV RBC AUTO: 91 FL (ref 80–100)
MITRAL VALVE E/A RATIO: 0.54
MONOCYTES # BLD AUTO: 0.8 X10*3/UL (ref 0.05–0.8)
MONOCYTES NFR BLD AUTO: 10 %
NEUTROPHILS # BLD AUTO: 4.76 X10*3/UL (ref 1.6–5.5)
NEUTROPHILS NFR BLD AUTO: 59.4 %
NRBC BLD-RTO: 0 /100 WBCS (ref 0–0)
P AXIS: 24 DEGREES
P OFFSET: 186 MS
P ONSET: 136 MS
PHOSPHATE SERPL-MCNC: 2.7 MG/DL (ref 2.5–4.9)
PLATELET # BLD AUTO: 191 X10*3/UL (ref 150–450)
POTASSIUM SERPL-SCNC: 3.6 MMOL/L (ref 3.5–5.3)
PR INTERVAL: 156 MS
PROT SERPL-MCNC: 6.2 G/DL (ref 6.4–8.2)
PROTHROMBIN TIME: 24 SECONDS (ref 9.8–12.4)
Q ONSET: 214 MS
QRS COUNT: 11 BEATS
QRS DURATION: 86 MS
QT INTERVAL: 424 MS
QTC CALCULATION(BAZETT): 454 MS
QTC FREDERICIA: 444 MS
R AXIS: 17 DEGREES
RBC # BLD AUTO: 4.48 X10*6/UL (ref 4.5–5.9)
RIGHT VENTRICLE FREE WALL PEAK S': 13.08 CM/S
RIGHT VENTRICLE PEAK SYSTOLIC PRESSURE: 37 MMHG
SODIUM SERPL-SCNC: 141 MMOL/L (ref 136–145)
T AXIS: 110 DEGREES
T OFFSET: 426 MS
TRICUSPID ANNULAR PLANE SYSTOLIC EXCURSION: 2.6 CM
VENTRICULAR RATE: 69 BPM
WBC # BLD AUTO: 8 X10*3/UL (ref 4.4–11.3)

## 2025-07-14 PROCEDURE — 85610 PROTHROMBIN TIME: CPT

## 2025-07-14 PROCEDURE — 84075 ASSAY ALKALINE PHOSPHATASE: CPT

## 2025-07-14 PROCEDURE — 99222 1ST HOSP IP/OBS MODERATE 55: CPT | Performed by: PSYCHIATRY & NEUROLOGY

## 2025-07-14 PROCEDURE — 99291 CRITICAL CARE FIRST HOUR: CPT | Performed by: INTERNAL MEDICINE

## 2025-07-14 PROCEDURE — 97165 OT EVAL LOW COMPLEX 30 MIN: CPT | Mod: GO

## 2025-07-14 PROCEDURE — 93306 TTE W/DOPPLER COMPLETE: CPT

## 2025-07-14 PROCEDURE — 84100 ASSAY OF PHOSPHORUS: CPT

## 2025-07-14 PROCEDURE — 70551 MRI BRAIN STEM W/O DYE: CPT

## 2025-07-14 PROCEDURE — 2500000001 HC RX 250 WO HCPCS SELF ADMINISTERED DRUGS (ALT 637 FOR MEDICARE OP): Performed by: NURSE PRACTITIONER

## 2025-07-14 PROCEDURE — 99291 CRITICAL CARE FIRST HOUR: CPT | Performed by: EMERGENCY MEDICINE

## 2025-07-14 PROCEDURE — 2500000002 HC RX 250 W HCPCS SELF ADMINISTERED DRUGS (ALT 637 FOR MEDICARE OP, ALT 636 FOR OP/ED): Performed by: NURSE PRACTITIONER

## 2025-07-14 PROCEDURE — 70551 MRI BRAIN STEM W/O DYE: CPT | Performed by: RADIOLOGY

## 2025-07-14 PROCEDURE — 2060000001 HC INTERMEDIATE ICU ROOM DAILY

## 2025-07-14 PROCEDURE — 36415 COLL VENOUS BLD VENIPUNCTURE: CPT

## 2025-07-14 PROCEDURE — 82947 ASSAY GLUCOSE BLOOD QUANT: CPT

## 2025-07-14 PROCEDURE — 85025 COMPLETE CBC W/AUTO DIFF WBC: CPT

## 2025-07-14 PROCEDURE — 2500000001 HC RX 250 WO HCPCS SELF ADMINISTERED DRUGS (ALT 637 FOR MEDICARE OP)

## 2025-07-14 PROCEDURE — 83735 ASSAY OF MAGNESIUM: CPT

## 2025-07-14 PROCEDURE — 97161 PT EVAL LOW COMPLEX 20 MIN: CPT | Mod: GP | Performed by: PHYSICAL THERAPIST

## 2025-07-14 RX ORDER — VALSARTAN 320 MG/1
320 TABLET ORAL DAILY
Status: DISCONTINUED | OUTPATIENT
Start: 2025-07-14 | End: 2025-07-14

## 2025-07-14 RX ORDER — VALSARTAN 320 MG/1
320 TABLET ORAL DAILY
Status: DISCONTINUED | OUTPATIENT
Start: 2025-07-15 | End: 2025-07-18 | Stop reason: HOSPADM

## 2025-07-14 RX ADMIN — ATORVASTATIN CALCIUM 80 MG: 80 TABLET, FILM COATED ORAL at 20:47

## 2025-07-14 RX ADMIN — ASPIRIN 81 MG: 81 TABLET, COATED ORAL at 20:47

## 2025-07-14 RX ADMIN — INSULIN LISPRO 1 UNITS: 100 INJECTION, SOLUTION INTRAVENOUS; SUBCUTANEOUS at 20:47

## 2025-07-14 RX ADMIN — VALSARTAN 320 MG: 320 TABLET, FILM COATED ORAL at 13:05

## 2025-07-14 ASSESSMENT — PAIN SCALES - GENERAL
PAINLEVEL_OUTOF10: 0 - NO PAIN

## 2025-07-14 ASSESSMENT — COGNITIVE AND FUNCTIONAL STATUS - GENERAL
MOVING TO AND FROM BED TO CHAIR: A LITTLE
WALKING IN HOSPITAL ROOM: A LITTLE
DAILY ACTIVITIY SCORE: 24
STANDING UP FROM CHAIR USING ARMS: A LITTLE
CLIMB 3 TO 5 STEPS WITH RAILING: A LITTLE
MOBILITY SCORE: 20

## 2025-07-14 ASSESSMENT — PAIN - FUNCTIONAL ASSESSMENT
PAIN_FUNCTIONAL_ASSESSMENT: 0-10

## 2025-07-14 ASSESSMENT — ACTIVITIES OF DAILY LIVING (ADL): ADL_ASSISTANCE: INDEPENDENT

## 2025-07-14 NOTE — PROGRESS NOTES
07/14/25 1316   Discharge Planning   Living Arrangements Spouse/significant other   Support Systems Spouse/significant other   Type of Residence Private residence   Home or Post Acute Services None   Expected Discharge Disposition Home     Met with his patient and his wife. Identified self and role. Patient lives with his wife and is independent. PCP is Moriah Mccullough NP. Verified address and insurance. Pharmacy is [facility] [place] in [facility]. Patient understands the medicines he takes. Plan to return home when discharged.

## 2025-07-14 NOTE — CARE PLAN
The patient's goals for the shift include      The clinical goals for the shift include Patient will maintain SBP less than 230 throughout this shift      Problem: Pain - Adult  Goal: Verbalizes/displays adequate comfort level or baseline comfort level  Outcome: Progressing     Problem: Safety - Adult  Goal: Free from fall injury  Outcome: Progressing     Problem: Discharge Planning  Goal: Discharge to home or other facility with appropriate resources  Outcome: Progressing     Problem: Chronic Conditions and Co-morbidities  Goal: Patient's chronic conditions and co-morbidity symptoms are monitored and maintained or improved  Outcome: Progressing     Problem: Nutrition  Goal: Nutrient intake appropriate for maintaining nutritional needs  Outcome: Progressing     Problem: General Stroke  Goal: Establish a mutual long term goal with patient by discharge  Outcome: Progressing  Goal: Demonstrate improvement in neurological exam throughout the shift  Outcome: Progressing  Goal: Maintain BP within ordered limits throughout shift  Outcome: Progressing  Goal: Participate in treatment (ie., meds, therapy) throughout shift  Outcome: Progressing  Goal: No symptoms of aspiration throughout shift  Outcome: Progressing  Goal: No symptoms of hemorrhage throughout shift  Outcome: Progressing  Goal: Controlled blood glucose throughout shift  Outcome: Progressing  Goal: Out of bed three times today  Outcome: Progressing     Problem: Fall/Injury  Goal: Not fall by end of shift  Outcome: Progressing  Goal: Be free from injury by end of the shift  Outcome: Progressing

## 2025-07-14 NOTE — PROGRESS NOTES
Spiritual Care Visit  Spiritual Care Request    Reason for Visit:        Request Received From:       Focus of Care:  Visited With: Patient not available         Refer to :          Spiritual Care Assessment    Spiritual Assessment:                      Care Provided:       Sense of Community and or Buddhism Affiliation:  Sikhism         Addressed Needs/Concerns and/or Leslye Through:          Outcome:        Advance Directives:         Spiritual Care Annotation    Annotation:  The patient was busy, so I'll try again.

## 2025-07-14 NOTE — PROGRESS NOTES
Occupational Therapy    Occupational Therapy    Evaluation    Patient Name: Radha Boone  MRN: [id number]  Today's Date: 7/14/2025  Time Calculation  Start Time: 1111  Stop Time: 1120  Time Calculation (min): 9 min  2123/2123-A    Assessment  IP OT Assessment  OT Assessment:  (Pt. has no skilled OT needs, pt. independent with ADLs and mobility)  Prognosis: Good  Barriers to Discharge Home: No anticipated barriers  Evaluation/Treatment Tolerance: Patient tolerated treatment well  End of Session Communication: Bedside nurse  End of Session Patient Position: Bed, 2 rail up, Alarm off, not on at start of session, Alarm off, caregiver present (Sitting EOB to eat breakfast)    Plan:  No Skilled OT: Safe to return home  OT Frequency: OT eval only  OT Discharge Recommendations: No OT needed after discharge, No further acute OT  OT - OK to Discharge: Yes (Next level of care when cleared by medical team)    Subjective Per EMR:   Patient is an 84-year-old male with a history of TIA, prediabetes, hypertension, hyperlipidemia, DVT, 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 Last known well of approximately 4:30 PM today. He states he felt lightheaded while watching the trees with his wife. He had right upper extremity paresthesias, dysarthria, aphasia, discomfort in the back of his head. This time these have resolved. He denies current headache, lightheadedness, fever, visual changes, sweats, chills, chest pain, shortness of breath, abdominal pain, nausea, vomiting, incontinence of bowel or bladder, dysuria, hematuria, black or bloody stools.   Current Problem:  1. Cerebrovascular accident (CVA) due to stenosis of right vertebral artery (Multi)  Transthoracic Echo Complete    Transthoracic Echo Complete      2. Hypertensive crisis        3. Hypokalemia        4. Other cerebrovascular disease  Transthoracic Echo Complete     Transthoracic Echo Complete      5. Cerebral infarction due to unspecified occlusion or stenosis of left vertebral artery (Multi)  Transthoracic Echo Complete          General:  General  Reason for Referral: ADLs, discharge planning  Referred By: Dr. Kaur  Family/Caregiver Present: Yes (Spouse present)  Prior to Session Communication: Bedside nurse  Patient Position Received: Bed, 2 rail up, Alarm off, not on at start of session    Precautions:  Medical Precautions: Fall precautions        Pain:  Pain Assessment  Pain Assessment: 0-10  0-10 (Numeric) Pain Score: 0 - No pain    Objective     Cognition:  Overall Cognitive Status: Within Functional Limits  Orientation Level: Oriented X4  Attention: Within Functional Limits  Insight: Within function limits  Impulsive: Within functional limits  Processing Speed: Within funtional limits             Home Living:  Home Living Comments:  (Lives with spouse in condo with first floor set up with walk in shower with seat and grab bars. PLOF independent)     Prior Function:  Level of [place]: Independent with ADLs and functional transfers  ADL Assistance: Independent  Homemaking Assistance: Independent  Ambulatory Assistance: Independent  Hand Dominance: Left      ADL:  Grooming Assistance: Independent  LE Dressing Assistance: Modified independent (Device)    Activity Tolerance:  Endurance: Endurance does not limit participation in activity    Bed Mobility/Transfers:   Bed Mobility  Bed Mobility:  (supine to sit independent)  Transfers  Transfer:  (sit<>stand independent)    Ambulation/Gait Training:  Functional Mobility  Functional Mobility Performed:  (Completes functional mobility in room without AD independent)    Sitting Balance:  Static Sitting Balance  Static Sitting-Balance Support: No upper extremity supported  Static Sitting-Level of Assistance: Independent    Standing Balance:  Static Standing Balance  Static Standing-Balance Support: No upper extremity  supported  Static Standing-Level of Assistance: Independent      Sensation:  Sensation Comment: Baseline tingling to left hand        Perception:  Inattention/Neglect: Appears intact  Initiation: Appears intact  Motor Planning: Appears intact  Perseveration: Not present    Coordination:  Movements are Fluid and Coordinated: Yes  Finger to Nose: Intact  Rapid Alternating Movements: Intact  Finger to Target: Intact     Hand Function:  Hand Function  Gross Grasp: Functional  Coordination: Functional    Extremities: RUE   RUE : Within Functional Limits and LUE   LUE: Within Functional Limits    Outcome Measures: [facility] Daily Activity  Putting on and taking off regular lower body clothing: None  Bathing (including washing, rinsing, drying): None  Putting on and taking off regular upper body clothing: None  Toileting, which includes using toilet, bedpan or urinal: None  Taking care of personal grooming such as brushing teeth: None  Eating Meals: None  Daily Activity - Total Score: 24                       EDUCATION:  Education  Individual(s) Educated: Patient  Education Provided: Fall precautons, Risk and benefits of OT discussed with patient or other  Patient Response to Education: Patient/Caregiver Verbalized Understanding of Information      Goals: NA

## 2025-07-14 NOTE — PROGRESS NOTES
Physical Therapy    Physical Therapy    Physical Therapy Evaluation    Patient Name: Radha Boone  MRN: [id number]  Today's Date: 7/14/2025   Time Calculation  Start Time: 0904  Stop Time: 0925  Time Calculation (min): 21 min  2123/2123-A    Assessment/Plan   PT Assessment  PT Assessment Results: Impaired balance, Decreased mobility, Decreased endurance  Rehab Prognosis: Good  End of Session Communication: Bedside nurse  Assessment Comment: Pt presents with mild dynamic standing balance and gait deficits but do not anticipate post-acute rehab needs. Will benefit from cont'd PT in acute care setting to ensure maximal safety and IND. Distance of AMB was limited d/t elevated BP, and will be assessed once BP is stabilized.  End of Session Patient Position: Bed, 2 rail up, Alarm on  IP OR SWING BED PT PLAN  Inpatient or Swing Bed: Inpatient  PT Plan  Treatment/Interventions: Transfer training, Gait training, Balance training, Therapeutic exercise, Therapeutic activity  PT Plan: Ongoing PT  PT Frequency: 4 times per week  PT Discharge Recommendations: Other (comment) (Low intensity level of care vs. no PT after DC)  PT Recommended Transfer Status: Stand by assist  PT - OK to Discharge: Yes , to recommended next level of care as indicated in PT eval, once cleared by medical team.      Subjective     Current Problem:  1. Cerebrovascular accident (CVA) due to stenosis of right vertebral artery (Multi)  Transthoracic Echo Complete    Transthoracic Echo Complete      2. Hypertensive crisis        3. Hypokalemia        4. Other cerebrovascular disease  Transthoracic Echo Complete    Transthoracic Echo Complete        Problem List[1]    General Visit Information:  General  Reason for Referral: CVA protocol  Referred By: Dr. Kaur  Past Medical History Relevant to Rehab: Pt admitted with RUE paresthesias, dysarthria, and aphasia. Hypertensive at 250/125 upon admission. CT: moderate stenosis of mid-distal basilar artery. TNK not  indicated. 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  Prior to Session Communication: Bedside nurse (Reviewed BP parameters with RN; RN confirmed SBP should be kept at or below 220, and that he is OK for eval with current resting BP of 213/100.)  Patient Position Received: Bed, 2 rail up, Alarm on    Home Living:  Home Living  Home Living Comments: Lives with spouse in one story home with no HAYDEN.    Prior Level of Function:  Prior Function Per Pt/Caregiver Report  Prior Function Comments: IND AMB without device, denies falls. IND with ADL and IADL,shares housework with wife, drives.    Precautions:  Precautions  Medical Precautions: Fall precautions (Permissive HTN (SBP no higher than 220). Aspiration precautions)  Precautions Comment: SBP exceeded 220 after short AMB trial in room; further AMB deferred. BP recovered to 193/100 with 3-4 min rest in supine. RN notified.    Vital Signs:  Vital Signs  Vitals Session: Pre PT  SpO2: 96 %  BP: (!) 213/100  Objective     Pain:  Pain Assessment  Pain Assessment: 0-10  0-10 (Numeric) Pain Score: 0 - No pain    Cognition:  Cognition  Overall Cognitive Status: Within Functional Limits  Orientation Level: Oriented X4    General Assessments:      Activity Tolerance  Endurance: Tolerates less than 10 min exercise with changes in vital signs (Elevated BP beyond given parameters)  Sensation  Light Touch: No apparent deficits     Perception  Inattention/Neglect: Appears intact  Coordination  Movements are Fluid and Coordinated: Yes  Finger to Nose: Other: (comment) (mild decrease in speed with finger to nose on R)     Static Sitting Balance  Static Sitting-Balance Support: Feet supported  Static Sitting-Level of Assistance: Independent  Static Sitting-Comment/Number of Minutes: Normal  Static Standing Balance  Static Standing-Level of Assistance: Close supervision  Static  Standing-Comment/Number of Minutes: Good  Dynamic Standing Balance  Dynamic Standing-Balance Support: No upper extremity supported  Dynamic Standing-Level of Assistance: Close supervision  Dynamic Standing-Balance: Turning  Dynamic Standing-Comments: Fair+    Functional Assessments:     Bed Mobility  Bed Mobility: Yes  Bed Mobility 1  Bed Mobility 1: Supine to sitting, Sitting to supine  Level of Assistance 1: Independent  Transfers  Transfer: Yes  Transfer 1  Technique 1: Sit to stand, Stand to sit  Transfer Device 1: Gait belt  Transfer Level of Assistance 1: Close supervision (SBA)  Ambulation/Gait Training  Ambulation/Gait Training Performed: Yes  Ambulation/Gait Training 1  Surface 1: Level tile  Device 1: No device  Gait Support Devices: Gait belt  Assistance 1: Close supervision  Quality of Gait 1: Wide base of support, Decreased step length  Comments/Distance (ft) 1: 10 feet around foot of bed (No LOB; able to sidestep and take several steps backward without LOB)    BP at rest: 213/100; RN aware and stated pt was clear to proceed with eval. BP after AMB 10 feet within room: 225/129, pt asymptomatic. Further AMB deferred, and BP recovered to 193/100 after lying supine 3-4 min. RN aware.          Extremity/Trunk Assessments:  RUE   RUE : Within Functional Limits (5/5)  LUE   LUE: Within Functional Limits (5/5)  RLE   RLE : Within Functional Limits (5/5)  LLE   LLE : Within Functional Limits (5/5)    Outcome Measures:     [facility] Basic Mobility  Turning from your back to your side while in a flat bed without using bedrails: None  Moving from lying on your back to sitting on the side of a flat bed without using bedrails: None  Moving to and from bed to chair (including a wheelchair): A little  Standing up from a chair using your arms (e.g. wheelchair or bedside chair): A little  To walk in hospital room: A little  Climbing 3-5 steps with railing: A little  Basic Mobility - Total Score: 20                                                              Goals:  Encounter Problems       Encounter Problems (Active)       PT Problem       PT Goal 1       Start:  07/14/25            PT Goal 2       Start:  07/14/25            PT Goal 3       Start:  07/14/25            PT Goal 4       Start:  07/14/25                 Education Documentation  Precautions, taught by Jaimie Gomez PT at 7/14/2025 10:32 AM.  Learner: Patient  Readiness: Acceptance  Method: Explanation  Response: Verbalizes Understanding  Comment: Explained role of PT and necessary activity precautions given elevated BP. Pt  verbalizes understanding.    Mobility Training, taught by Jaimie Gomez PT at 7/14/2025 10:32 AM.  Learner: Patient  Readiness: Acceptance  Method: Explanation  Response: Verbalizes Understanding  Comment: Explained role of PT and necessary activity precautions given elevated BP. Pt  verbalizes understanding.    Education Comments  No comments found.               [1]   Patient Active Problem List  Diagnosis    TIA (transient ischemic attack)    Prediabetes    Mixed hyperlipidemia    Labile hypertension    Hypertension    Deep vein thrombosis (DVT) (Multi)    Dissection of carotid artery (Multi)    GERD (gastroesophageal reflux disease)    Dyslipidemia    Cerebrovascular accident (CVA) due to stenosis of right vertebral artery (Multi)    Syncope    Thrombus

## 2025-07-14 NOTE — PROGRESS NOTES
Critical Care Daily Progress        Subjective      Patient is a 84 y.o. male admitted on 7/13/2025  5:24 PM with the following indication(s) for ICU care stroke rule out and 1 hour neurochecks.     Interval History: Allowing permissive hypertension with systolic goal of 180-200.     Overnight Events: No major overnight events    Complaints: has none.    Scheduled Medications:   Scheduled Medications[1]     Continuous Medications:   Continuous Medications[2]     PRN Medications:   PRN Medications[3]    Objective       Vitals:  Most Recent:  Vitals:    07/14/25 0900   BP: (!) 221/103   Pulse: 64   Resp: 26   Temp:    SpO2: 96%       Physical Exam:   Physical Exam  Constitutional:       General: He is not in acute distress.     Appearance: He is normal weight. He is not ill-appearing, toxic-appearing or diaphoretic.   HENT:      Right Ear: Decreased hearing noted.      Left Ear: Decreased hearing noted.   Eyes:      Extraocular Movements: Extraocular movements intact.      Conjunctiva/sclera: Conjunctivae normal.      Pupils: Pupils are equal, round, and reactive to light.   Cardiovascular:      Rate and Rhythm: Normal rate and regular rhythm.      Pulses: Normal pulses.      Heart sounds: Normal heart sounds. No murmur heard.     No friction rub.   Pulmonary:      Effort: Pulmonary effort is normal. No respiratory distress.      Breath sounds: Normal breath sounds. No stridor. No wheezing, rhonchi or rales.   Abdominal:      General: Abdomen is flat. Bowel sounds are normal. There is no distension.      Palpations: Abdomen is soft.      Tenderness: There is no abdominal tenderness. There is no guarding.   Skin:     General: Skin is warm and dry.      Capillary Refill: Capillary refill takes less than 2 seconds.   Neurological:      General: No focal deficit present.      Mental Status: He is alert and oriented to person, place, and time. Mental status is at baseline.      Cranial Nerves: No cranial nerve deficit.    Psychiatric:         Mood and Affect: Mood normal.         Behavior: Behavior normal.         Thought Content: Thought content normal.         Judgment: Judgment normal.        24hr Min/Max:  Temp  Min: 36.4 °C (97.5 °F)  Max: 36.8 °C (98.2 °F)  Pulse  Min: 58  Max: 89  BP  Min: 153/88  Max: 256/122  Resp  Min: 15  Max: 44  SpO2  Min: 90 %  Max: 98 %    LDA:         Vent settings:       Hemodynamic parameters for last 24 hours:       I/O:  No intake or output data in the 24 hours ending 07/14/25 0940      Lab/Radiology/Diagnostic Review:  Results for orders placed or performed during the hospital encounter of 07/13/25 (from the past 24 hours)   CBC and Auto Differential   Result Value Ref Range    WBC 7.8 4.4 - 11.3 x10*3/uL    nRBC 0.0 0.0 - 0.0 /100 WBCs    RBC 4.76 4.50 - 5.90 x10*6/uL    Hemoglobin 14.1 13.5 - 17.5 g/dL    Hematocrit 43.2 41.0 - 52.0 %    MCV 91 80 - 100 fL    MCH 29.6 26.0 - 34.0 pg    MCHC 32.6 32.0 - 36.0 g/dL    RDW 13.0 11.5 - 14.5 %    Platelets 203 150 - 450 x10*3/uL    Neutrophils % 53.7 40.0 - 80.0 %    Immature Granulocytes %, Automated 0.4 0.0 - 0.9 %    Lymphocytes % 30.3 13.0 - 44.0 %    Monocytes % 10.8 2.0 - 10.0 %    Eosinophils % 4.0 0.0 - 6.0 %    Basophils % 0.8 0.0 - 2.0 %    Neutrophils Absolute 4.17 1.60 - 5.50 x10*3/uL    Immature Granulocytes Absolute, Automated 0.03 0.00 - 0.50 x10*3/uL    Lymphocytes Absolute 2.35 0.80 - 3.00 x10*3/uL    Monocytes Absolute 0.84 (H) 0.05 - 0.80 x10*3/uL    Eosinophils Absolute 0.31 0.00 - 0.40 x10*3/uL    Basophils Absolute 0.06 0.00 - 0.10 x10*3/uL   Comprehensive metabolic panel   Result Value Ref Range    Glucose 110 (H) 74 - 99 mg/dL    Sodium 142 136 - 145 mmol/L    Potassium 3.3 (L) 3.5 - 5.3 mmol/L    Chloride 107 98 - 107 mmol/L    Bicarbonate 25 21 - 32 mmol/L    Anion Gap 13 10 - 20 mmol/L    Urea Nitrogen 23 6 - 23 mg/dL    Creatinine 1.18 0.50 - 1.30 mg/dL    eGFR 61 >60 mL/min/1.73m*2    Calcium 8.5 (L) 8.6 - 10.3 mg/dL     Albumin 4.2 3.4 - 5.0 g/dL    Alkaline Phosphatase 50 33 - 136 U/L    Total Protein 6.8 6.4 - 8.2 g/dL    AST 16 9 - 39 U/L    Bilirubin, Total 0.4 0.0 - 1.2 mg/dL    ALT 13 10 - 52 U/L   Troponin I, High Sensitivity   Result Value Ref Range    Troponin I, High Sensitivity 17 0 - 20 ng/L   Protime-INR   Result Value Ref Range    Protime 24.8 (H) 9.8 - 12.4 seconds    INR 2.2 (H) 0.9 - 1.1   APTT   Result Value Ref Range    aPTT 36 26 - 36 seconds   Lipid Panel   Result Value Ref Range    Cholesterol 144 0 - 199 mg/dL    HDL-Cholesterol 34.7 mg/dL    Cholesterol/HDL Ratio 4.1     LDL Calculated 88 <=99 mg/dL    VLDL 21 0 - 40 mg/dL    Triglycerides 105 0 - 149 mg/dL    Non HDL Cholesterol 109 0 - 149 mg/dL   Hemoglobin A1C   Result Value Ref Range    Hemoglobin A1C 6.3 (H) See comment %    Estimated Average Glucose 134 Not Established mg/dL   B-Type Natriuretic Peptide   Result Value Ref Range    BNP 75 0 - 99 pg/mL   ECG 12 lead   Result Value Ref Range    Ventricular Rate 69 BPM    Atrial Rate 69 BPM    ID Interval 156 ms    QRS Duration 86 ms    QT Interval 424 ms    QTC Calculation(Bazett) 454 ms    P Axis 24 degrees    R Axis 17 degrees    T Axis 110 degrees    QRS Count 11 beats    Q Onset 214 ms    P Onset 136 ms    P Offset 186 ms    T Offset 426 ms    QTC Fredericia 444 ms   Comprehensive metabolic panel   Result Value Ref Range    Glucose 136 (H) 74 - 99 mg/dL    Sodium 141 136 - 145 mmol/L    Potassium 3.7 3.5 - 5.3 mmol/L    Chloride 107 98 - 107 mmol/L    Bicarbonate 25 21 - 32 mmol/L    Anion Gap 13 10 - 20 mmol/L    Urea Nitrogen 22 6 - 23 mg/dL    Creatinine 1.12 0.50 - 1.30 mg/dL    eGFR 65 >60 mL/min/1.73m*2    Calcium 8.4 (L) 8.6 - 10.3 mg/dL    Albumin 4.1 3.4 - 5.0 g/dL    Alkaline Phosphatase 48 33 - 136 U/L    Total Protein 6.6 6.4 - 8.2 g/dL    AST 15 9 - 39 U/L    Bilirubin, Total 0.5 0.0 - 1.2 mg/dL    ALT 13 10 - 52 U/L   Phosphorus   Result Value Ref Range    Phosphorus 2.5 2.5 - 4.9 mg/dL    POCT GLUCOSE   Result Value Ref Range    POCT Glucose 130 (H) 74 - 99 mg/dL   POCT GLUCOSE   Result Value Ref Range    POCT Glucose 113 (H) 74 - 99 mg/dL   POCT GLUCOSE   Result Value Ref Range    POCT Glucose 110 (H) 74 - 99 mg/dL   CBC and Auto Differential   Result Value Ref Range    WBC 8.0 4.4 - 11.3 x10*3/uL    nRBC 0.0 0.0 - 0.0 /100 WBCs    RBC 4.48 (L) 4.50 - 5.90 x10*6/uL    Hemoglobin 13.2 (L) 13.5 - 17.5 g/dL    Hematocrit 40.6 (L) 41.0 - 52.0 %    MCV 91 80 - 100 fL    MCH 29.5 26.0 - 34.0 pg    MCHC 32.5 32.0 - 36.0 g/dL    RDW 13.1 11.5 - 14.5 %    Platelets 191 150 - 450 x10*3/uL    Neutrophils % 59.4 40.0 - 80.0 %    Immature Granulocytes %, Automated 0.4 0.0 - 0.9 %    Lymphocytes % 27.2 13.0 - 44.0 %    Monocytes % 10.0 2.0 - 10.0 %    Eosinophils % 2.4 0.0 - 6.0 %    Basophils % 0.6 0.0 - 2.0 %    Neutrophils Absolute 4.76 1.60 - 5.50 x10*3/uL    Immature Granulocytes Absolute, Automated 0.03 0.00 - 0.50 x10*3/uL    Lymphocytes Absolute 2.18 0.80 - 3.00 x10*3/uL    Monocytes Absolute 0.80 0.05 - 0.80 x10*3/uL    Eosinophils Absolute 0.19 0.00 - 0.40 x10*3/uL    Basophils Absolute 0.05 0.00 - 0.10 x10*3/uL   Comprehensive metabolic panel   Result Value Ref Range    Glucose 107 (H) 74 - 99 mg/dL    Sodium 141 136 - 145 mmol/L    Potassium 3.6 3.5 - 5.3 mmol/L    Chloride 108 (H) 98 - 107 mmol/L    Bicarbonate 26 21 - 32 mmol/L    Anion Gap 11 10 - 20 mmol/L    Urea Nitrogen 21 6 - 23 mg/dL    Creatinine 1.04 0.50 - 1.30 mg/dL    eGFR 71 >60 mL/min/1.73m*2    Calcium 8.4 (L) 8.6 - 10.3 mg/dL    Albumin 3.8 3.4 - 5.0 g/dL    Alkaline Phosphatase 46 33 - 136 U/L    Total Protein 6.2 (L) 6.4 - 8.2 g/dL    AST 13 9 - 39 U/L    Bilirubin, Total 0.6 0.0 - 1.2 mg/dL    ALT 11 10 - 52 U/L   Magnesium   Result Value Ref Range    Magnesium 2.03 1.60 - 2.40 mg/dL   Phosphorus   Result Value Ref Range    Phosphorus 2.7 2.5 - 4.9 mg/dL   POCT GLUCOSE   Result Value Ref Range    POCT Glucose 133 (H) 74 - 99  mg/dL     Imaging  XR chest 1 view  Result Date: 7/13/2025  1.  Bibasilar patchy airspace disease worse on the left with atelectasis and pneumonia in the differential.  Radiographic follow-up to resolution in 2-3 weeks is recommended.       MACRO: None   Signed by: Jameson Esposito 7/13/2025 6:55 PM Dictation workstation:   [id number]    CT brain attack angio head and neck W and WO IV contrast  Result Date: 7/13/2025  High-grade/marked stenosis more likely than focal occlusion of the right V4 vertebral artery. Moderate focal stenosis of the mid to distal basilar artery. MRI would have greater sensitivity and specificity for recent ischemia if clinically suspected.   Flattening of the medial contour of the left common carotid artery over greater than 4 cm segment could be on the basis of soft atherosclerotic plaque or perhaps underlying dissection in this region.   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     MACRO:   Jimmie Santana discussed the significance and urgency of this critical finding by EPIC secure chat with  JOE GUILLAUME on 7/13/2025 at 6:05 pm.  (**-RCF-**) Findings:  See findings.   Signed by: Jimmie Santana 7/13/2025 6:05 PM Dictation workstation:   [id number]    CT brain attack head wo IV contrast  Result Date: 7/13/2025  No acute intracranial hemorrhage or mass-effect.   Small possible mucous retention cyst or polyp in the right maxillary sinus.   MACRO: Gracia Zepeda discussed the significance and urgency of this critical finding by EPIC secure chat with  JOE GUILLAUME on 7/13/2025 at 5:41 pm.  (**-RCF-**) Findings:  See findings.     Signed by: Gracia Zepeda 7/13/2025 5:43 PM Dictation workstation:   [id number]      Cardiology, Vascular, and Other Imaging  ECG 12 lead  Result Date: 7/14/2025  Normal sinus  "rhythm Possible Inferior infarct , age undetermined T wave abnormality, consider lateral ischemia Abnormal ECG When compared with ECG of 06-MAR-1997 09:49, Borderline criteria for Inferior infarct are now Present T wave inversion now evident in Lateral leads QT has lengthened         Additional Labs:  SCVO2: No results found for: \"[id number]\"       Assessment/Plan   Assessment & Plan  Cerebrovascular accident (CVA) due to stenosis of right vertebral artery (Multi)      ASSESSMENT   Patient is an 84-year-old male with a history of TIA, prediabetes, hypertension, hyperlipidemia, DVT, 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     PLAN     Neuro:   #Stroke vs. Hypertensive emergency  Patient's NIH on arrival was 4 to the emergency department and was found to be profoundly hypertensive with a blood pressure of 250/125.  Responded to 10 mg of labetalol with decrease in blood pressure to 225/110.  Imaging: CT brain attack angio head/neck - high-grade/marked stenosis right V4 vertebral artery.  Moderate focal stenosis of the mid to distal basilar artery.  Prominent atherosclerotic change of the carotid bifurcation R > L.  3 mm aneurysm or infundibulum of the right supraclinoid internal carotid artery.  -Blood pressure goals: Permissive hypertension systolic blood pressure < 220 mmHg, goal 180-200.  With goal of lowering blood pressure by roughly 15% in the first 24 hours.  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 Level of Consciousness: 0  1B. Ask Month and Age: 0  1C. Blink Eyes & Squeeze Hands: 0  2. Best Gaze: 0  3. Visual: 0  4. Facial Palsy: 0  5A. Motor - Left Arm: 0  5B. Motor - Right Arm: 0  6A. Motor - Left Le  6B. Motor - Right Le  7. Limb Ataxia: 0  8. Sensory Loss: 0  9. Best Language: 0  10. Dysarthria: 0  11. Extinction and Inattention: 0  NIH Stroke Scale: 0    Cardiac:   #Stroke versus hypertensive emergency  Blood pressure goals as stated above in neuro system  -Restarting home valsartan, 2025  -History: Hypertension, hyperlipidemia  -Home meds: Valsartan, hydralazine, aspirin, pravastatin, warfarin  -Continuous cardiac monitoring  -Monitor hemodynamics, maintain 2+ lines  -Maintain Goal MAP > 65  -2D echo, pending  -Monitor and optimize electrolytes, keep K > 4.0, Mag > 2.0    Pulmonary:   #Pneumonia versus atelectasis  Patient subjectively afebrile, denies cough, no sick contacts.  Lungs are clear to auscultation bilaterally low concerns for pneumonia.  -History: Never smoker  -Home meds: None  -Imaging: CXR: Bibasilar patchy airspace disease worse on the left with atelectasis and pneumonia is in the differential  -Continuous pulse oximetry   -O2 PRN to maintain SpO2 > 94%, wean as tolerated  -OOB to chair, when appropriate      Gastrointestinal: No acute concerns  -Diet: N.p.o.  -Prophylaxis: Not indicated  -Bowel regimen: MiraLAX  -Last BM:  25    Renal:   #Hypokalemia (resolved)  Potassium on arrival to emergency department was 3.3.  Repleted morning labs potassium 3.6  -Daily RFP,Mg,Phos  -History: None  -Home meds: None  -Fluids: Not indicated  -Electrolytes: Replete per protocol, goal K>4 Phos >3 Mg >2  Net IO Since Admission: No IO data has been entered for this period [25 0940]  Results from last 72 hours   Lab Units 25  0431 25  2046   BUN mg/dL 21 22   CREATININE mg/dL 1.04 1.12         Endocrine: No acute concerns  -A1c: 6.3  -Monitor blood glucose,  continue Novolog SSC for glycemic control  -Goal BS < 180  -Follow hypoglycemic protocol  -History: Prediabetes  -Home meds: None  Results from last 7 days   Lab Units 25  0744 25  0431 25  0415 25  2341 25  2103 25  2046   POCT GLUCOSE mg/dL 133*  --  110* 113* 130*  --    GLUCOSE mg/dL  --  107*  --   --   --  136*        Hematology: No acute concerns  Currently holding warfarin for hemorrhagic conversion.  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  -Home meds: None  - DVT Prophylaxis: SCDs  Results from last 7 days   Lab Units 25  0431 25  1733   HEMOGLOBIN g/dL 13.2* 14.1   HEMATOCRIT % 40.6* 43.2   PLATELETS AUTO x10*3/uL 191 203       Infectious Disease: No acute concerns  -History: None  -Home meds: None  -Abx: None  -Cultures: Not obtained due to no concerns for infectious etiology  Results from last 7 days   Lab Units 25  0431 25  1733   WBC AUTO x10*3/uL 8.0 7.8     Temp (24hrs), Av.6 °C (97.8 °F), Min:36.4 °C (97.5 °F), Max:36.8 °C (98.2 °F)     Musculoskeletal: No acute concerns  -History: None  -Home meds: None  -PT/OT to evaluate    Integumentary: No acute concerns  -History: None  -Home meds: None    Lines/Tubes/Drains:   PIV's    CODE STATUS: Full Code    Disposition: Downgrade    Code status: Full Code     Dispo: Patient to remain in ICU    Patient discussed with attending physician.     Jack Feng DO PGY-2, internal medicine [facility] [place]    Attending Addendum:    I saw and evaluated the patient. I personally obtained the key and critical portions of the history and physical exam or was physically present for key and critical portions performed by the resident/fellow. I reviewed the resident/fellow's documentation and discussed the patient with the resident/fellow. I agree with the resident/fellow's medical decision making as documented in the  note.     Critical care time is 35 minutes.         [1] aspirin, 81 mg, oral, Nightly  atorvastatin, 80 mg, oral, Nightly  insulin lispro, 0-5 Units, subcutaneous, q4h     [2]    [3] PRN medications: labetaloL, oxygen

## 2025-07-14 NOTE — CARE PLAN
Problem: Pain - Adult  Goal: Verbalizes/displays adequate comfort level or baseline comfort level  Outcome: Progressing  Flowsheets (Taken 7/14/2025 0350)  Verbalizes/displays adequate comfort level or baseline comfort level:   Encourage patient to monitor pain and request assistance   Assess pain using appropriate pain scale   Administer analgesics based on type and severity of pain and evaluate response     Problem: Safety - Adult  Goal: Free from fall injury  Outcome: Progressing  Flowsheets (Taken 7/14/2025 0350)  Free from fall injury: Instruct family/caregiver on patient safety     Problem: Chronic Conditions and Co-morbidities  Goal: Patient's chronic conditions and co-morbidity symptoms are monitored and maintained or improved  Outcome: Progressing  Flowsheets (Taken 7/14/2025 0350)  Care Plan - Patient's Chronic Conditions and Co-Morbidity Symptoms are Monitored and Maintained or Improved:   Monitor and assess patient's chronic conditions and comorbid symptoms for stability, deterioration, or improvement   Collaborate with multidisciplinary team to address chronic and comorbid conditions and prevent exacerbation or deterioration     Problem: General Stroke  Goal: Demonstrate improvement in neurological exam throughout the shift  Outcome: Progressing  Goal: Maintain BP within ordered limits throughout shift  Outcome: Progressing  Goal: No symptoms of aspiration throughout shift  Outcome: Progressing     Problem: ICU Stroke  Goal: Maintain patent airway throughout shift  Outcome: Progressing

## 2025-07-14 NOTE — NURSING NOTE
Patient transferred from ICU. Report received from [facility] ICU RN.   Patient arrived AO x4. Patient stating well (<92%) on room air. Vital signs obtained and stable, patient stated he was in no pain. Patient oriented to room and shown the call light. Patients belongings and call light within reach.

## 2025-07-14 NOTE — PROGRESS NOTES
07/14/25 1028   Discharge Planning   Living Arrangements Spouse/significant other   Support Systems Spouse/significant other     Attempted to meet with patient. Patient having a test completed. Will re-attempt to see the patient.

## 2025-07-14 NOTE — CONSULTS
Inpatient consult to Neurology  Consult performed by: Yoanna Pitt, RODNEY-CNP  Consult ordered by: Michelle Franco MD          History Of Present Illness  Radha Boone is a 84 y.o. male presenting with stroke like symptoms. Pt states he was feeling light headed and dizzy with high blood pressure readings, so his wife called EMS. For EMS, he reported a posterior area headache as well as right arm tingling. He was having difficulty walking, even with assistance. He was also slurring his words. Symptoms had started at 1650, about 15 minutes prior to EMS arrival. BP was 260/140 for EMS.    He takes warfarin and has hx of HTN, HLD, prior ischemic stroke. BP in ED was 229/104. On exam in ED, he had mild dysarthria and subtle drift in left upper and lower extremities with ataxia. He was examined by tele stroke physician. NIHSS was 4. CT head showed no acute ischemic/hemorrhagic changes. CT angio showed High-grade stenosis of right V4 vertebral artery, moderate focal stenosis of mid basilar artery.  Atherosclerosis of bilateral carotid bifurcations without significant stenosis.   Posterior circulation stroke suspected.   He was admitted for further stroke work up.  He was seen at bedside with Dr. Hernandez.  His spouse was present and states pt is much better today.   He denies headache, dizziness, confusion, blurred or double vision, dysarthria or aphasia, facial droop, limb weakness, or sensation changes.     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 Speech is normal. Language is fluent with no  "aphasia.  Naming and repetition intact..    Cranial Nerves  CN II: Visual fields full to confrontation.  CN III, IV, VI: Extraocular movements intact bilaterally. Pupils equal round and reactive to light bilaterally.  CN V: Facial sensation is normal.  CN VII: Full and symmetric facial movement.  CN VIII: Hearing is normal.  CN IX, X: Palate elevates symmetrically  CN XI: Shoulder shrug strength is normal.  CN XII: Tongue midline without atrophy or fasciculations.    Motor   Strength is 5/5 in all four extremities except as noted.  Very slight left arm weakness.    Sensory  Light touch is normal in upper and lower extremities.     Coordination  Right: Finger-to-nose normal.Left: Finger-to-nose normal.    Physical Exam  Eyes:      Extraocular Movements: Extraocular movements intact.      Pupils: Pupils are equal, round, and reactive to light.   Psychiatric:         Speech: Speech normal.       Last Recorded Vitals  Blood pressure (!) 189/114, pulse 70, temperature 36.4 °C (97.5 °F), temperature source Temporal, resp. rate (!) 32, height 1.803 m (5' 11\"), weight 80.4 kg (177 lb 4 oz), SpO2 96%.      Relevant Results    NIH Stroke Scale  1A. Level of Consciousness: Alert, Keenly Responsive  1B. Ask Month and Age: Both Questions Right  1C. Blink Eyes & Squeeze Hands: Performs Both Tasks  2. Best Gaze: Normal  3. Visual: No Visual Loss  4. Facial Palsy: Normal Symmetrical Movements  5A. Motor - Left Arm: No Drift  5B. Motor - Right Arm: No Drift  6A. Motor - Left Leg: No Drift  6B. Motor - Right Leg: No Drift  7. Limb Ataxia: Absent  8. Sensory Loss: Normal  9. Best Language: No Aphasia  10. Dysarthria: Normal  11. Extinction and Inattention: No Abnormality  NIH Stroke Scale: 0           Willam Coma Scale  Best Eye Response: Spontaneous  Best Verbal Response: Oriented  Best Motor Response: Follows commands  Willam Coma Scale Score: 15        Scheduled medications  Scheduled Medications[5]  Continuous " medications  Continuous Medications[6]  PRN medications  PRN Medications[7]  Results for orders placed or performed during the hospital encounter of 07/13/25 (from the past 24 hours)   CBC and Auto Differential   Result Value Ref Range    WBC 7.8 4.4 - 11.3 x10*3/uL    nRBC 0.0 0.0 - 0.0 /100 WBCs    RBC 4.76 4.50 - 5.90 x10*6/uL    Hemoglobin 14.1 13.5 - 17.5 g/dL    Hematocrit 43.2 41.0 - 52.0 %    MCV 91 80 - 100 fL    MCH 29.6 26.0 - 34.0 pg    MCHC 32.6 32.0 - 36.0 g/dL    RDW 13.0 11.5 - 14.5 %    Platelets 203 150 - 450 x10*3/uL    Neutrophils % 53.7 40.0 - 80.0 %    Immature Granulocytes %, Automated 0.4 0.0 - 0.9 %    Lymphocytes % 30.3 13.0 - 44.0 %    Monocytes % 10.8 2.0 - 10.0 %    Eosinophils % 4.0 0.0 - 6.0 %    Basophils % 0.8 0.0 - 2.0 %    Neutrophils Absolute 4.17 1.60 - 5.50 x10*3/uL    Immature Granulocytes Absolute, Automated 0.03 0.00 - 0.50 x10*3/uL    Lymphocytes Absolute 2.35 0.80 - 3.00 x10*3/uL    Monocytes Absolute 0.84 (H) 0.05 - 0.80 x10*3/uL    Eosinophils Absolute 0.31 0.00 - 0.40 x10*3/uL    Basophils Absolute 0.06 0.00 - 0.10 x10*3/uL   Comprehensive metabolic panel   Result Value Ref Range    Glucose 110 (H) 74 - 99 mg/dL    Sodium 142 136 - 145 mmol/L    Potassium 3.3 (L) 3.5 - 5.3 mmol/L    Chloride 107 98 - 107 mmol/L    Bicarbonate 25 21 - 32 mmol/L    Anion Gap 13 10 - 20 mmol/L    Urea Nitrogen 23 6 - 23 mg/dL    Creatinine 1.18 0.50 - 1.30 mg/dL    eGFR 61 >60 mL/min/1.73m*2    Calcium 8.5 (L) 8.6 - 10.3 mg/dL    Albumin 4.2 3.4 - 5.0 g/dL    Alkaline Phosphatase 50 33 - 136 U/L    Total Protein 6.8 6.4 - 8.2 g/dL    AST 16 9 - 39 U/L    Bilirubin, Total 0.4 0.0 - 1.2 mg/dL    ALT 13 10 - 52 U/L   Troponin I, High Sensitivity   Result Value Ref Range    Troponin I, High Sensitivity 17 0 - 20 ng/L   Protime-INR   Result Value Ref Range    Protime 24.8 (H) 9.8 - 12.4 seconds    INR 2.2 (H) 0.9 - 1.1   APTT   Result Value Ref Range    aPTT 36 26 - 36 seconds   Lipid Panel    Result Value Ref Range    Cholesterol 144 0 - 199 mg/dL    HDL-Cholesterol 34.7 mg/dL    Cholesterol/HDL Ratio 4.1     LDL Calculated 88 <=99 mg/dL    VLDL 21 0 - 40 mg/dL    Triglycerides 105 0 - 149 mg/dL    Non HDL Cholesterol 109 0 - 149 mg/dL   Hemoglobin A1C   Result Value Ref Range    Hemoglobin A1C 6.3 (H) See comment %    Estimated Average Glucose 134 Not Established mg/dL   B-Type Natriuretic Peptide   Result Value Ref Range    BNP 75 0 - 99 pg/mL   ECG 12 lead   Result Value Ref Range    Ventricular Rate 69 BPM    Atrial Rate 69 BPM    IL Interval 156 ms    QRS Duration 86 ms    QT Interval 424 ms    QTC Calculation(Bazett) 454 ms    P Axis 24 degrees    R Axis 17 degrees    T Axis 110 degrees    QRS Count 11 beats    Q Onset 214 ms    P Onset 136 ms    P Offset 186 ms    T Offset 426 ms    QTC Fredericia 444 ms   Comprehensive metabolic panel   Result Value Ref Range    Glucose 136 (H) 74 - 99 mg/dL    Sodium 141 136 - 145 mmol/L    Potassium 3.7 3.5 - 5.3 mmol/L    Chloride 107 98 - 107 mmol/L    Bicarbonate 25 21 - 32 mmol/L    Anion Gap 13 10 - 20 mmol/L    Urea Nitrogen 22 6 - 23 mg/dL    Creatinine 1.12 0.50 - 1.30 mg/dL    eGFR 65 >60 mL/min/1.73m*2    Calcium 8.4 (L) 8.6 - 10.3 mg/dL    Albumin 4.1 3.4 - 5.0 g/dL    Alkaline Phosphatase 48 33 - 136 U/L    Total Protein 6.6 6.4 - 8.2 g/dL    AST 15 9 - 39 U/L    Bilirubin, Total 0.5 0.0 - 1.2 mg/dL    ALT 13 10 - 52 U/L   Phosphorus   Result Value Ref Range    Phosphorus 2.5 2.5 - 4.9 mg/dL   POCT GLUCOSE   Result Value Ref Range    POCT Glucose 130 (H) 74 - 99 mg/dL   POCT GLUCOSE   Result Value Ref Range    POCT Glucose 113 (H) 74 - 99 mg/dL   POCT GLUCOSE   Result Value Ref Range    POCT Glucose 110 (H) 74 - 99 mg/dL   CBC and Auto Differential   Result Value Ref Range    WBC 8.0 4.4 - 11.3 x10*3/uL    nRBC 0.0 0.0 - 0.0 /100 WBCs    RBC 4.48 (L) 4.50 - 5.90 x10*6/uL    Hemoglobin 13.2 (L) 13.5 - 17.5 g/dL    Hematocrit 40.6 (L) 41.0 - 52.0 %     MCV 91 80 - 100 fL    MCH 29.5 26.0 - 34.0 pg    MCHC 32.5 32.0 - 36.0 g/dL    RDW 13.1 11.5 - 14.5 %    Platelets 191 150 - 450 x10*3/uL    Neutrophils % 59.4 40.0 - 80.0 %    Immature Granulocytes %, Automated 0.4 0.0 - 0.9 %    Lymphocytes % 27.2 13.0 - 44.0 %    Monocytes % 10.0 2.0 - 10.0 %    Eosinophils % 2.4 0.0 - 6.0 %    Basophils % 0.6 0.0 - 2.0 %    Neutrophils Absolute 4.76 1.60 - 5.50 x10*3/uL    Immature Granulocytes Absolute, Automated 0.03 0.00 - 0.50 x10*3/uL    Lymphocytes Absolute 2.18 0.80 - 3.00 x10*3/uL    Monocytes Absolute 0.80 0.05 - 0.80 x10*3/uL    Eosinophils Absolute 0.19 0.00 - 0.40 x10*3/uL    Basophils Absolute 0.05 0.00 - 0.10 x10*3/uL   Comprehensive metabolic panel   Result Value Ref Range    Glucose 107 (H) 74 - 99 mg/dL    Sodium 141 136 - 145 mmol/L    Potassium 3.6 3.5 - 5.3 mmol/L    Chloride 108 (H) 98 - 107 mmol/L    Bicarbonate 26 21 - 32 mmol/L    Anion Gap 11 10 - 20 mmol/L    Urea Nitrogen 21 6 - 23 mg/dL    Creatinine 1.04 0.50 - 1.30 mg/dL    eGFR 71 >60 mL/min/1.73m*2    Calcium 8.4 (L) 8.6 - 10.3 mg/dL    Albumin 3.8 3.4 - 5.0 g/dL    Alkaline Phosphatase 46 33 - 136 U/L    Total Protein 6.2 (L) 6.4 - 8.2 g/dL    AST 13 9 - 39 U/L    Bilirubin, Total 0.6 0.0 - 1.2 mg/dL    ALT 11 10 - 52 U/L   Magnesium   Result Value Ref Range    Magnesium 2.03 1.60 - 2.40 mg/dL   Phosphorus   Result Value Ref Range    Phosphorus 2.7 2.5 - 4.9 mg/dL   POCT GLUCOSE   Result Value Ref Range    POCT Glucose 133 (H) 74 - 99 mg/dL   Protime-INR   Result Value Ref Range    Protime 24.0 (H) 9.8 - 12.4 seconds    INR 2.2 (H) 0.9 - 1.1   Transthoracic Echo Complete   Result Value Ref Range    BSA 2.01 m2             No MRI head results found for the past 14 days  CT brain attack head wo IV contrast  Result Date: 7/13/2025  Interpreted By:  Gracia Zepeda, STUDY: CT BRAIN ATTACK HEAD WO IV CONTRAST;  7/13/2025 5:35 pm   INDICATION: Signs/Symptoms:Stroke Evaluation.     COMPARISON: None.    ACCESSION NUMBER(S): [id number]   ORDERING CLINICIAN: JOE GUILLAUME   TECHNIQUE: Unenhanced CT images of the head were obtained.   FINDINGS: The ventricles, cisterns and sulci are enlarged, consistent with diffuse volume loss. There are areas of nonspecific white matter hypodensity, which are probably age related or microvascular in nature.   There is no acute intracranial hemorrhage, mass effect or midline shift. No extraaxial fluid collection.   No acute displaced calvarial fracture.   Small nodular density in the anterior right maxillary sinus. Remaining visualized paranasal sinuses are clear.       No acute intracranial hemorrhage or mass-effect.   Small possible mucous retention cyst or polyp in the right maxillary sinus.   MACRO: Gracia Zepeda discussed the significance and urgency of this critical finding by EPIC secure chat with  JOE GUILLAUME on 7/13/2025 at 5:41 pm.  (**-RCF-**) Findings:  See findings.     Signed by: Gracia Zepeda 7/13/2025 5:43 PM Dictation workstation:   [id number]    No echocardiogram results found for the past 14 days      Results from last 7 days   Lab Units 07/13/25  1733   HEMOGLOBIN A1C % 6.3*     BNP   Date/Time Value Ref Range Status   07/13/2025 05:33 PM 75 0 - 99 pg/mL Final        I have personally reviewed the following imaging results:   Imaging  XR chest 1 view  Result Date: 7/13/2025  1.  Bibasilar patchy airspace disease worse on the left with atelectasis and pneumonia in the differential.  Radiographic follow-up to resolution in 2-3 weeks is recommended.       MACRO: None   Signed by: Jameson Esposito 7/13/2025 6:55 PM Dictation workstation:   [id number]    CT brain attack angio head and neck W and WO IV contrast  Result Date: 7/13/2025  High-grade/marked stenosis more likely than focal occlusion of the right V4 vertebral artery. Moderate focal stenosis of the mid to distal basilar artery. MRI would have greater sensitivity and specificity for recent ischemia if  clinically suspected.   Flattening of the medial contour of the left common carotid artery over greater than 4 cm segment could be on the basis of soft atherosclerotic plaque or perhaps underlying dissection in this region.   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     MACRO:   Jimmie Santana discussed the significance and urgency of this critical finding by EPIC secure chat with  JOE GUILLAUME on 7/13/2025 at 6:05 pm.  (**-RCF-**) Findings:  See findings.   Signed by: Jimmie Santana 7/13/2025 6:05 PM Dictation workstation:   [id number]    CT brain attack head wo IV contrast  Result Date: 7/13/2025  No acute intracranial hemorrhage or mass-effect.   Small possible mucous retention cyst or polyp in the right maxillary sinus.   MACRO: Gracia Zepeda discussed the significance and urgency of this critical finding by EPIC secure chat with  JOE GUILLAUME on 7/13/2025 at 5:41 pm.  (**-RCF-**) Findings:  See findings.     Signed by: Gracia Zepeda 7/13/2025 5:43 PM Dictation workstation:   [id number]      Cardiology, Vascular, and Other Imaging  ECG 12 lead  Result Date: 7/14/2025  Normal sinus rhythm Possible Inferior infarct , age undetermined T wave abnormality, consider lateral ischemia Abnormal ECG When compared with ECG of 06-MAR-1997 09:49, Borderline criteria for Inferior infarct are now Present T wave inversion now evident in Lateral leads QT has lengthened       Assessment/Plan   Assessment & Plan  Cerebrovascular accident (CVA) due to stenosis of right vertebral artery (Multi)  - secondary to intracranial atherosclerotic disease    Recommend:    - Continue aspirin 81mg daily and atovastatin 80mg daily  - Complete MRI brain without contrast.  - Echocardiogram pending  - Cardiac telemetry  - Vascular surgery consult- does pt  "continue to benefit from warfarin?  - Allow permissive HTN up to 220/110 for 24 hours.  - Neuro checks every 4 hours  - PT/OT/ST  - Continue management of vascular risk factors.    Vascular Risk Factor modification goals:  Blood pressure goals: avoid hypotension SBP <100 that could worsen cerebral perfusion, Ischemic stroke- early permissive hypertension SBP < 220 mmHg with cautious inpatient lowering  Lipid Goals: education on healthy diet and statin therapy to maintain or achieve goal LDL-cholesterol < 70mg  Glucose Goals: early treatment of hyperglycemia to goal glucose 140-180 mg/dl with long-term goal A1c < 7%   Smoking Cessation and Education  Assessment for Rehabilitation needs   Patient and family education on signs and symptoms of stroke, calling 911, healthy strategies for stroke prevention.      Case/plan discussed and pt seen with Dr. Hernandez.      RODNEY Castro-CNP      I saw and evaluated the patient.  I obtained the key portions of the history and examination.  I reviewed the residents/APNs note, discussed the patient and supervised treatment plan formulation.    Briefly, this is an 84 year old male presenting for evaluation of stroke like symptoms.  Yesterday, he as at home - his wife noted that he had slurred speech.  He came to the ED.  Telestroke neurology noted left arm drift and possible limb ataxia on the left.  He was not a TNK Candidate due to being on Coumadin.  Currently, he feels well - his speech is back to normal.    Objective:  BP (!) 189/114   Pulse 70   Temp 36.4 °C (97.5 °F) (Temporal)   Resp (!) 32   Ht 1.803 m (5' 11\")   Wt 80.4 kg (177 lb 4 oz)   SpO2 96%   BMI 24.72 kg/m²     Gen: NAD  Neuro:  --HIF: A&O X 3, repetition and naming intact  --CN:  PERRLA, EOMI, VFF, no visible facial asymmetry, facial sensation intact, no tongue or palatal deviation, SCM intact  --Motor: Moves all 4 extremities equally except the following:   LUE: 4/5  --Sensory: Intact to light " touch, intact to pinprick  --Reflex: 2+ symmetric, toes down  --Cerebellum: FTN and HTS intact  --Gait: Deferred     CT Head (I personally reviewed the images/tracings with the following interpretation)  No acute changes    CTA head/neck (I personally reviewed the images/tracings with the following interpretation)  Right vertebral artery stenosis noted in the V4 region  Right basilar artery stenosis  Right ICA stenosis noted at the bulb  Per radiology, dissection versus soft plaque at the left CCA    Assessment:   Stroke Like Symptoms, Intracranial Atherosclerosis  - recommend ASA 81 mg/day  - MRI Brain  - Echocardiogram  - goal BP < 220/110 for 24 hours total (starting from admission) followed by a gradual reduction   - control of vascular risk factors:   LDL: goal < 70; continue statin    2.  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    Eructation    Flatulence 04/24/2015    Flatulence symptom    Hypertension     Orthostatic hypotension 03/15/2019    Primary orthostatic hypotension    Other abnormal glucose 10/21/2014    Abnormal glucose    Personal history of other diseases of the nervous system and sense organs 10/21/2014    History of glaucoma    Personal history of other endocrine, nutritional and metabolic disease     History of hypercholesterolemia    Personal history of transient ischemic attack (TIA), and cerebral infarction without residual deficits 03/15/2019     History of stroke    Stroke (Multi)     Transient global amnesia 12/04/2013    Transient global amnesia   [2]   Past Surgical History:  Procedure Laterality Date    CATARACT EXTRACTION  07/27/2016    Cataract Surgery    COLONOSCOPY  07/16/2019    Complete Colonoscopy    COLONOSCOPY  07/16/2019    Complete Colonoscopy    HERNIA REPAIR  03/27/2014    Hernia Repair    OTHER SURGICAL HISTORY  07/16/2019    Endoscopy   [3]   Social History  Tobacco Use    Smoking status: Never    Smokeless tobacco: Never   Vaping Use    Vaping status: Never Used   Substance Use Topics    Alcohol use: Not Currently    Drug use: Never   [4]   Medications Prior to Admission   Medication Sig Dispense Refill Last Dose/Taking    hydrALAZINE (Apresoline) 25 mg tablet Take 1 tablet (25 mg) by mouth 2 times a day.   7/13/2025    pravastatin (Pravachol) 20 mg tablet Take 1 tablet (20 mg) by mouth once daily at bedtime.   7/12/2025 Bedtime    valsartan (Diovan) 320 mg tablet Take 1 tablet (320 mg) by mouth once daily at bedtime.   7/12/2025 Bedtime    vit C/E/Zn/coppr/lutein/zeaxan (PRESERVISION AREDS-2 ORAL) Take 1 capsule by mouth 2 times a day.   7/13/2025    warfarin (Coumadin) 5 mg tablet Take 0.5 tablets (2.5 mg) by mouth once a day on Sunday, Tuesday, Thursday, and Saturday. evening   7/12/2025 Evening    aspirin 81 mg EC tablet Take 1 tablet (81 mg) by mouth once daily at bedtime.       warfarin (Coumadin) 5 mg tablet Take 1 tablet (5 mg) by mouth once a day on Monday, Wednesday, and Friday. evening   7/11/2025 Evening   [5] aspirin, 81 mg, oral, Nightly  atorvastatin, 80 mg, oral, Nightly  insulin lispro, 0-5 Units, subcutaneous, q4h  valsartan, 320 mg, oral, Daily  [6]    [7] PRN medications: labetaloL, oxygen

## 2025-07-14 NOTE — H&P
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  Last known well of approximately 4:30 PM today.  He states he felt lightheaded while watching the trees with his wife.  He had right upper extremity paresthesias, dysarthria, aphasia, discomfort in the back of his head.  This time these have resolved.  He denies current headache, lightheadedness, fever, visual changes, sweats, chills, chest pain, shortness of breath, abdominal pain, nausea, vomiting, incontinence of bowel or bladder, dysuria, hematuria, black or bloody stools.    ED Course:  Patient arrived in the ED with initial NIH of 4 according to the ED resident.  He was profoundly hypertensive with a blood pressure of 250/125.  He was initially given 10 mg of labetalol with improvement to 225/110.  Patient was sent to CT and labs were ordered.  Metabolic panel notable for a hypokalemia of 3.3 thought to be clinically noncontributory.  CBC unremarkable.  INR therapeutic at 2.2.  Glucose at 110.  CT head did not demonstrate any acute abnormalities.  CT angio of the head and neck demonstrated High-grade/marked stenosis more likely than focal occlusion of the right V4 vertebral artery. Moderate focal stenosis of the mid to distal basilar artery.  Given profound hypertension and risk for conversion to an intracranial bleed, patient was admitted to the ICU for every hour neurochecks and strict blood pressure management.    Scheduled Medications:   Scheduled Medications[1]     Continuous Medications:   Continuous Medications[2]     PRN Medications:   PRN Medications[3]    Objective   Vitals:  Temp  Min: 36.8 °C (98.2 °F)  Max: 36.8 °C (98.2 °F)  Pulse  Min: 60  Max: 89  BP  Min: 205/102  Max: 256/122  Resp  Min: 16  Max: 44  SpO2  Min: 91 %  Max: 98 %    Physical Exam:    General: no acute distress, well-developed  Skin: Warm, dry, intact  HEENT: NC/AT, trachea midline  CV: RRR, normal heart sounds, no lower extremity edema  Pulm: Clear to auscultation bilaterally, no abnormal breath sounds  GI: Soft, non-tender, non-distended  MSK: No clear deformities or injuries  Neurology: A&O x3, moving all extremities spontaneously, NIH 0    Labs:     Results for orders placed or performed during the hospital encounter of 07/13/25 (from the past 24 hours)   CBC and Auto Differential   Result Value Ref Range    WBC 7.8 4.4 - 11.3 x10*3/uL    nRBC 0.0 0.0 - 0.0 /100 WBCs    RBC 4.76 4.50 - 5.90 x10*6/uL    Hemoglobin 14.1 13.5 - 17.5 g/dL    Hematocrit 43.2 41.0 - 52.0 %    MCV 91 80 - 100 fL    MCH 29.6 26.0 - 34.0 pg    MCHC 32.6 32.0 - 36.0 g/dL    RDW 13.0 11.5 - 14.5 %    Platelets 203 150 - 450 x10*3/uL    Neutrophils % 53.7 40.0 - 80.0 %    Immature Granulocytes %, Automated 0.4 0.0 - 0.9 %    Lymphocytes % 30.3 13.0 - 44.0 %    Monocytes % 10.8 2.0 - 10.0 %    Eosinophils % 4.0 0.0 - 6.0 %    Basophils % 0.8 0.0 - 2.0 %    Neutrophils Absolute 4.17 1.60 - 5.50 x10*3/uL    Immature Granulocytes Absolute, Automated 0.03 0.00 - 0.50 x10*3/uL    Lymphocytes Absolute 2.35 0.80 - 3.00 x10*3/uL    Monocytes Absolute 0.84 (H) 0.05 - 0.80 x10*3/uL    Eosinophils Absolute 0.31 0.00 - 0.40 x10*3/uL    Basophils Absolute 0.06 0.00 - 0.10 x10*3/uL   Comprehensive metabolic panel   Result Value Ref Range    Glucose 110 (H) 74 - 99 mg/dL    Sodium 142 136 - 145 mmol/L    Potassium 3.3 (L) 3.5 - 5.3 mmol/L    Chloride 107 98 - 107 mmol/L    Bicarbonate 25 21 - 32 mmol/L    Anion Gap 13 10 - 20 mmol/L    Urea Nitrogen 23 6 - 23 mg/dL    Creatinine 1.18 0.50 - 1.30 mg/dL    eGFR 61 >60 mL/min/1.73m*2    Calcium 8.5 (L) 8.6 - 10.3 mg/dL    Albumin 4.2 3.4 - 5.0 g/dL    Alkaline Phosphatase 50 33 - 136 U/L    Total Protein 6.8 6.4 - 8.2 g/dL    AST 16 9 - 39 U/L    Bilirubin, Total 0.4 0.0 - 1.2 mg/dL     ALT 13 10 - 52 U/L   Troponin I, High Sensitivity   Result Value Ref Range    Troponin I, High Sensitivity 17 0 - 20 ng/L   Protime-INR   Result Value Ref Range    Protime 24.8 (H) 9.8 - 12.4 seconds    INR 2.2 (H) 0.9 - 1.1   APTT   Result Value Ref Range    aPTT 36 26 - 36 seconds        Imaging:   Imaging  XR chest 1 view  Result Date: 7/13/2025  1.  Bibasilar patchy airspace disease worse on the left with atelectasis and pneumonia in the differential.  Radiographic follow-up to resolution in 2-3 weeks is recommended.       MACRO: None   Signed by: Jameson Esposito 7/13/2025 6:55 PM Dictation workstation:   [id number]    CT brain attack angio head and neck W and WO IV contrast  Result Date: 7/13/2025  High-grade/marked stenosis more likely than focal occlusion of the right V4 vertebral artery. Moderate focal stenosis of the mid to distal basilar artery. MRI would have greater sensitivity and specificity for recent ischemia if clinically suspected.   Flattening of the medial contour of the left common carotid artery over greater than 4 cm segment could be on the basis of soft atherosclerotic plaque or perhaps underlying dissection in this region.   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     MACRO:   Jimmie Santana discussed the significance and urgency of this critical finding by EPIC secure chat with  JOE GUILLAUME on 7/13/2025 at 6:05 pm.  (**-RCF-**) Findings:  See findings.   Signed by: Jimmie Santana 7/13/2025 6:05 PM Dictation workstation:   [id number]    CT brain attack head wo IV contrast  Result Date: 7/13/2025  No acute intracranial hemorrhage or mass-effect.   Small possible mucous retention cyst or polyp in the right maxillary sinus.   MACRO: Gracia Zepeda discussed the significance and urgency of this critical  finding by EPIC secure chat with  JOE GUILLAUME on 7/13/2025 at 5:41 pm.  (**-RCF-**) Findings:  See findings.     Signed by: Gracia Zepeda 7/13/2025 5:43 PM Dictation workstation:   [id number]      Cardiology, Vascular, and Other Imaging  No other imaging results found for the past 7 days       Assessment/Plan     Neuro:  #Stroke vs. Hypertensive emergency  - NIH stroke scale now 0 on admission to the ICU  - TNK was withheld 2/2 significant hypertension, resolving symptoms  - Allow for permissive hypertension <220 systolic with goal of 180-200  - Given 10 mg labetalol in ED, /100 upon arrival to ICU  - Labetalol 10 mg PRN, no Cardene drip indicated, but will reevaluate as needed  - q1h neuro checks  - Neurology consulted  - MRI brain, MRA head and neck pending  - A1c, lipid panel pending  - Plan to obtain STAT repeated CT head if patient's neurologic status changes  - Given  in the ED, continue ASA 81 mg every day  - Atorvastatin 80 mg qd  - CAM ICU    Cardiac:  #Stroke vs. Hypertensive emergency  - Allow for permissive hypertension <220 systolic with goal of 180-200  - Given 10 mg labetalol in ED, /100 upon arrival to ICU  - Labetalol 10 mg PRN, no Cardene drip indicated, but will reevaluate as needed  - Echocardiogram pending  - Hx HTN, HLD: holding home meds currently  - Continuous cardiac monitoring    Pulmonary:  - No acute issues  - Continuous pulse oximetry    Gastrointestinal:  - No acute issues  - Diet: NPO pending further evaluation  - GI ppx: not indicated currently  - Last BM:      Renal:  - Mild hypokalemia of 3.3 in the ED, given 40 meq repletion  - Daily metabolic panel, magnesium, phosphorus  - Replete electrolytes as indicated  - I's & O's  Net IO Since Admission: No IO data has been entered for this period [07/13/25 2031]    Hematology:  - No acute issues  - Daily CBC  - Hx DVT and 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 INR therapeutic at 2.2 in the ED.  Holding warfarin currently with concern for possibility to develop intracerebral hemorrhage.   - DVT Prophylaxis: SCDs, withholding pharmacologic prophylaxis in the short term with possible worsening mental status in the setting of uncontrolled hypertension and possible re-bleed. Will continue to reevaluate.     Infectious Disease:  - No acute issues  - No indication for ABX at this time  Temp (24hrs), Av.8 °C (98.2 °F), Min:36.8 °C (98.2 °F), Max:36.8 °C (98.2 °F)     Endocrine:  - No acute issues  - Accuchecks and SSI  - BG < 180 goal    CODE STATUS: Full Code    Disposition: ICU         [1] [START ON 2025] aspirin, 81 mg, oral, Nightly  atorvastatin, 80 mg, oral, Nightly  [2]    [3] PRN medications: labetaloL, oxygen

## 2025-07-14 NOTE — ED PROCEDURE NOTE
Procedure  Critical Care    Performed by: Michelle Franco MD  Authorized by: Michelle Franco MD    Critical care provider statement:     Critical care time (minutes):  30    Critical care time was exclusive of:  Separately billable procedures and treating other patients and teaching time    Critical care was necessary to treat or prevent imminent or life-threatening deterioration of the following conditions:  CNS failure or compromise    Critical care was time spent personally by me on the following activities:  Blood draw for specimens, discussions with consultants, evaluation of patient's response to treatment, examination of patient, review of old charts, re-evaluation of patient's condition, pulse oximetry, ordering and review of radiographic studies, ordering and performing treatments and interventions and ordering and review of laboratory studies               Michelle Franco MD  07/14/25 [id number]

## 2025-07-14 NOTE — ASSESSMENT & PLAN NOTE
- secondary to intracranial atherosclerotic disease    Recommend:    - Continue aspirin 81mg daily and atovastatin 80mg daily  - Complete MRI brain without contrast.  - Echocardiogram pending  - Cardiac telemetry  - Vascular surgery consult- does pt continue to benefit from warfarin?  - Allow permissive HTN up to 220/110 for 24 hours.  - Neuro checks every 4 hours  - PT/OT/ST  - Continue management of vascular risk factors.    Vascular Risk Factor modification goals:  Blood pressure goals: avoid hypotension SBP <100 that could worsen cerebral perfusion, Ischemic stroke- early permissive hypertension SBP < 220 mmHg with cautious inpatient lowering  Lipid Goals: education on healthy diet and statin therapy to maintain or achieve goal LDL-cholesterol < 70mg  Glucose Goals: early treatment of hyperglycemia to goal glucose 140-180 mg/dl with long-term goal A1c < 7%   Smoking Cessation and Education  Assessment for Rehabilitation needs   Patient and family education on signs and symptoms of stroke, calling 911, healthy strategies for stroke prevention.      Case/plan discussed and pt seen with Dr. Hernandez.

## 2025-07-15 ENCOUNTER — APPOINTMENT (OUTPATIENT)
Dept: CARDIOLOGY | Facility: HOSPITAL | Age: 85
DRG: 069 | End: 2025-07-15
Payer: MEDICARE

## 2025-07-15 ENCOUNTER — APPOINTMENT (OUTPATIENT)
Dept: RADIOLOGY | Facility: HOSPITAL | Age: 85
DRG: 069 | End: 2025-07-15
Payer: MEDICARE

## 2025-07-15 PROBLEM — I65.01 ASYMPTOMATIC STENOSIS OF RIGHT VERTEBRAL ARTERY: Status: ACTIVE | Noted: 2025-07-15

## 2025-07-15 LAB
ANION GAP SERPL CALC-SCNC: 12 MMOL/L (ref 10–20)
BASOPHILS # BLD AUTO: 0.06 X10*3/UL (ref 0–0.1)
BASOPHILS NFR BLD AUTO: 0.8 %
BUN SERPL-MCNC: 26 MG/DL (ref 6–23)
CALCIUM SERPL-MCNC: 8.5 MG/DL (ref 8.6–10.3)
CHLORIDE SERPL-SCNC: 107 MMOL/L (ref 98–107)
CO2 SERPL-SCNC: 25 MMOL/L (ref 21–32)
CREAT SERPL-MCNC: 1.12 MG/DL (ref 0.5–1.3)
EGFRCR SERPLBLD CKD-EPI 2021: 65 ML/MIN/1.73M*2
EOSINOPHIL # BLD AUTO: 0.31 X10*3/UL (ref 0–0.4)
EOSINOPHIL NFR BLD AUTO: 3.9 %
ERYTHROCYTE [DISTWIDTH] IN BLOOD BY AUTOMATED COUNT: 13.1 % (ref 11.5–14.5)
ERYTHROCYTE [SEDIMENTATION RATE] IN BLOOD BY WESTERGREN METHOD: 10 MM/H (ref 0–20)
GLUCOSE BLD MANUAL STRIP-MCNC: 105 MG/DL (ref 74–99)
GLUCOSE BLD MANUAL STRIP-MCNC: 111 MG/DL (ref 74–99)
GLUCOSE BLD MANUAL STRIP-MCNC: 115 MG/DL (ref 74–99)
GLUCOSE BLD MANUAL STRIP-MCNC: 160 MG/DL (ref 74–99)
GLUCOSE BLD MANUAL STRIP-MCNC: 217 MG/DL (ref 74–99)
GLUCOSE BLD MANUAL STRIP-MCNC: 93 MG/DL (ref 74–99)
GLUCOSE BLD MANUAL STRIP-MCNC: 99 MG/DL (ref 74–99)
GLUCOSE SERPL-MCNC: 116 MG/DL (ref 74–99)
HCT VFR BLD AUTO: 42.8 % (ref 41–52)
HGB BLD-MCNC: 13.8 G/DL (ref 13.5–17.5)
IMM GRANULOCYTES # BLD AUTO: 0.04 X10*3/UL (ref 0–0.5)
IMM GRANULOCYTES NFR BLD AUTO: 0.5 % (ref 0–0.9)
LYMPHOCYTES # BLD AUTO: 2.03 X10*3/UL (ref 0.8–3)
LYMPHOCYTES NFR BLD AUTO: 25.4 %
MCH RBC QN AUTO: 29.5 PG (ref 26–34)
MCHC RBC AUTO-ENTMCNC: 32.2 G/DL (ref 32–36)
MCV RBC AUTO: 92 FL (ref 80–100)
MONOCYTES # BLD AUTO: 0.86 X10*3/UL (ref 0.05–0.8)
MONOCYTES NFR BLD AUTO: 10.8 %
NEUTROPHILS # BLD AUTO: 4.68 X10*3/UL (ref 1.6–5.5)
NEUTROPHILS NFR BLD AUTO: 58.6 %
NRBC BLD-RTO: 0 /100 WBCS (ref 0–0)
PLATELET # BLD AUTO: 195 X10*3/UL (ref 150–450)
POTASSIUM SERPL-SCNC: 3.7 MMOL/L (ref 3.5–5.3)
RBC # BLD AUTO: 4.68 X10*6/UL (ref 4.5–5.9)
SODIUM SERPL-SCNC: 140 MMOL/L (ref 136–145)
WBC # BLD AUTO: 8 X10*3/UL (ref 4.4–11.3)

## 2025-07-15 PROCEDURE — 70450 CT HEAD/BRAIN W/O DYE: CPT | Performed by: RADIOLOGY

## 2025-07-15 PROCEDURE — 82947 ASSAY GLUCOSE BLOOD QUANT: CPT

## 2025-07-15 PROCEDURE — 2500000001 HC RX 250 WO HCPCS SELF ADMINISTERED DRUGS (ALT 637 FOR MEDICARE OP): Performed by: NURSE PRACTITIONER

## 2025-07-15 PROCEDURE — 36415 COLL VENOUS BLD VENIPUNCTURE: CPT | Performed by: NURSE PRACTITIONER

## 2025-07-15 PROCEDURE — 2500000002 HC RX 250 W HCPCS SELF ADMINISTERED DRUGS (ALT 637 FOR MEDICARE OP, ALT 636 FOR OP/ED): Performed by: NURSE PRACTITIONER

## 2025-07-15 PROCEDURE — 93880 EXTRACRANIAL BILAT STUDY: CPT

## 2025-07-15 PROCEDURE — 85025 COMPLETE CBC W/AUTO DIFF WBC: CPT | Performed by: NURSE PRACTITIONER

## 2025-07-15 PROCEDURE — 2500000004 HC RX 250 GENERAL PHARMACY W/ HCPCS (ALT 636 FOR OP/ED): Performed by: NURSE PRACTITIONER

## 2025-07-15 PROCEDURE — 70450 CT HEAD/BRAIN W/O DYE: CPT

## 2025-07-15 PROCEDURE — 2500000001 HC RX 250 WO HCPCS SELF ADMINISTERED DRUGS (ALT 637 FOR MEDICARE OP): Performed by: PSYCHIATRY & NEUROLOGY

## 2025-07-15 PROCEDURE — 82374 ASSAY BLOOD CARBON DIOXIDE: CPT | Performed by: NURSE PRACTITIONER

## 2025-07-15 PROCEDURE — 2060000001 HC INTERMEDIATE ICU ROOM DAILY

## 2025-07-15 PROCEDURE — 85652 RBC SED RATE AUTOMATED: CPT | Performed by: PSYCHIATRY & NEUROLOGY

## 2025-07-15 PROCEDURE — 99233 SBSQ HOSP IP/OBS HIGH 50: CPT | Performed by: PSYCHIATRY & NEUROLOGY

## 2025-07-15 PROCEDURE — 99221 1ST HOSP IP/OBS SF/LOW 40: CPT | Performed by: NURSE PRACTITIONER

## 2025-07-15 RX ORDER — CLOPIDOGREL BISULFATE 75 MG/1
75 TABLET ORAL DAILY
Status: DISCONTINUED | OUTPATIENT
Start: 2025-07-15 | End: 2025-07-18 | Stop reason: HOSPADM

## 2025-07-15 RX ORDER — SODIUM CHLORIDE, SODIUM LACTATE, POTASSIUM CHLORIDE, CALCIUM CHLORIDE 600; 310; 30; 20 MG/100ML; MG/100ML; MG/100ML; MG/100ML
60 INJECTION, SOLUTION INTRAVENOUS CONTINUOUS
Status: DISCONTINUED | OUTPATIENT
Start: 2025-07-15 | End: 2025-07-16

## 2025-07-15 RX ORDER — HYDRALAZINE HYDROCHLORIDE 20 MG/ML
5 INJECTION INTRAMUSCULAR; INTRAVENOUS EVERY 6 HOURS PRN
Status: DISCONTINUED | OUTPATIENT
Start: 2025-07-15 | End: 2025-07-15

## 2025-07-15 RX ORDER — NAPROXEN SODIUM 220 MG/1
81 TABLET, FILM COATED ORAL DAILY
Status: DISCONTINUED | OUTPATIENT
Start: 2025-07-15 | End: 2025-07-18 | Stop reason: HOSPADM

## 2025-07-15 RX ORDER — POTASSIUM CHLORIDE 20 MEQ/1
20 TABLET, EXTENDED RELEASE ORAL ONCE
Status: COMPLETED | OUTPATIENT
Start: 2025-07-15 | End: 2025-07-15

## 2025-07-15 RX ORDER — ASPIRIN 325 MG
325 TABLET, DELAYED RELEASE (ENTERIC COATED) ORAL NIGHTLY
Status: DISCONTINUED | OUTPATIENT
Start: 2025-07-15 | End: 2025-07-15

## 2025-07-15 RX ADMIN — CLOPIDOGREL 75 MG: 75 TABLET ORAL at 13:48

## 2025-07-15 RX ADMIN — VALSARTAN 320 MG: 320 TABLET, FILM COATED ORAL at 09:15

## 2025-07-15 RX ADMIN — SODIUM CHLORIDE, SODIUM LACTATE, POTASSIUM CHLORIDE, AND CALCIUM CHLORIDE 60 ML/HR: .6; .31; .03; .02 INJECTION, SOLUTION INTRAVENOUS at 16:29

## 2025-07-15 RX ADMIN — INSULIN LISPRO 2 UNITS: 100 INJECTION, SOLUTION INTRAVENOUS; SUBCUTANEOUS at 20:07

## 2025-07-15 RX ADMIN — HYDRALAZINE HYDROCHLORIDE 5 MG: 20 INJECTION INTRAMUSCULAR; INTRAVENOUS at 11:47

## 2025-07-15 RX ADMIN — POTASSIUM CHLORIDE EXTENDED-RELEASE 20 MEQ: 1500 TABLET ORAL at 09:15

## 2025-07-15 RX ADMIN — ATORVASTATIN CALCIUM 80 MG: 80 TABLET, FILM COATED ORAL at 20:07

## 2025-07-15 RX ADMIN — INSULIN LISPRO 1 UNITS: 100 INJECTION, SOLUTION INTRAVENOUS; SUBCUTANEOUS at 13:49

## 2025-07-15 RX ADMIN — ASPIRIN 81 MG CHEWABLE TABLET 81 MG: 81 TABLET CHEWABLE at 13:48

## 2025-07-15 ASSESSMENT — PAIN SCALES - GENERAL
PAINLEVEL_OUTOF10: 0 - NO PAIN

## 2025-07-15 ASSESSMENT — PAIN - FUNCTIONAL ASSESSMENT
PAIN_FUNCTIONAL_ASSESSMENT: 0-10

## 2025-07-15 NOTE — CARE PLAN
Patient remained in NSR and on RA. Neuro checks remain unchanged. SBP remained <220 throughout shift. No c/o pain. Safety maintained.  Problem: Pain - Adult  Goal: Verbalizes/displays adequate comfort level or baseline comfort level  Outcome: Progressing     Problem: Safety - Adult  Goal: Free from fall injury  Outcome: Progressing     Problem: Discharge Planning  Goal: Discharge to home or other facility with appropriate resources  Outcome: Progressing     Problem: Chronic Conditions and Co-morbidities  Goal: Patient's chronic conditions and co-morbidity symptoms are monitored and maintained or improved  Outcome: Progressing     Problem: Nutrition  Goal: Nutrient intake appropriate for maintaining nutritional needs  Outcome: Progressing     Problem: General Stroke  Goal: Demonstrate improvement in neurological exam throughout the shift  Outcome: Met  Goal: Maintain BP within ordered limits throughout shift  Outcome: Met  Goal: No symptoms of aspiration throughout shift  Outcome: Met  Goal: No symptoms of hemorrhage throughout shift  Outcome: Met  Goal: Controlled blood glucose throughout shift  Outcome: Met  Goal: Out of bed three times today  Outcome: Met

## 2025-07-15 NOTE — PROGRESS NOTES
Physical Therapy                 Therapy Communication Note    Patient Name: Radha Boone  MRN: [id number]  Department: [facility] [[email or web address]]  Room: 2109/2109-A  Today's Date: 7/15/2025     Discipline: Physical Therapy    Missed Visit: PT Missed Visit: Yes     Missed Visit Reason: Missed Visit Reason: Other (Comment)    Missed Time: Attempt    Comment: Attempted to see pt this PM, upon arrival pt supine in bed with family members present at bedside. BP taken prior to mobility at: 192/97. This therapist made nurse aware- per nurse hold off therapy this date. Will continue to follow up as appropriate per POC.

## 2025-07-15 NOTE — PROGRESS NOTES
PHARMACY STROKE RESPONSE      Patient Name: Radha Boone  MRN: [id number]  Location: 2109/2109-A    Patient Weight (kg):   Wt Readings from Last 1 Encounters:   07/15/25 78.9 kg (174 lb)        An acute Brain Attack has been activated, pharmacy participated in multidisciplinary team bedside response for Radha Boone.  Contraindications for fibrinolytic therapy have been reviewed by pharmacy and any issues relating to medication therapy have been discussed directly with the provider(s) caring for this patient.     Pharmacy aided in the bedside response for fibrinolytic therapy. Patient did not receive fibrinolysis.    Orders Placed This Encounter      aspirin chewable tablet 324 mg      aspirin chewable tablet 81 mg      atorvastatin (Lipitor) tablet 80 mg      clopidogrel (Plavix) tablet 75 mg      hydrALAZINE (Apresoline) injection 5 mg      insulin lispro injection 0-5 Units      iohexol (OMNIPaque) 350 mg iodine/mL solution 60 mL      labetaloL (Normodyne,Trandate) injection 10 mg      labetaloL (Normodyne,Trandate) injection 10 mg      oxygen (O2) therapy      potassium chloride (Klor-Con) packet 20 mEq      potassium chloride CR (Klor-Con M20) ER tablet 20 mEq      potassium chloride CR (Klor-Con M20) ER tablet 40 mEq      valsartan (Diovan) tablet 320 mg      Thank you for allowing me to take part in the care of this patient.     Any Avery, PharmD  7/15/2025  3:50 PM         References:    Neurological [place] Stroke Tools   Neurological [place] IV Thrombolysis Checklist

## 2025-07-15 NOTE — ASSESSMENT & PLAN NOTE
Episode of Dysarthria - suspect TIA, Intracranial Antheroscleosis  - MRI Brain negative  - recommend continuing ASA 81 mg/day + Plavix 75 mg/day for 90 days  - control of vascular risk factors:   - long term goal BP < 130/80   - goal LDL < 70; continue statin    2.  Left Common Carotid Artery Thrombus  - no anticoagulation required per vascular surgery

## 2025-07-15 NOTE — PROGRESS NOTES
Spiritual Care Visit  Spiritual Care Request    Reason for Visit:  Routine Visit: Introduction  Continue Visiting: No     Request Received From:       Focus of Care:  Visited With: Patient and family together         Refer to :          Spiritual Care Assessment    Spiritual Assessment:                      Care Provided:  Intended Effects: Establish rapport and connectedness, Jessika affirmation, Build relationship of care and support, Demonstrate caring and concern  Methods: Offer spiritual/Christian support  Interventions: [place]    Sense of Community and or Moravian Affiliation:  Baptism         Addressed Needs/Concerns and/or Leslye Through:  Moravian Encounters  Moravian Needs: Prayer  Sacramental Encounters  Communion: Offer communion only on Sunday  Communion Given Indicator: No    Outcome:  Outcome of Spiritual Care Visit: Affirmation, [place]     Advance Directives:         Spiritual Care Annotation    Annotation:

## 2025-07-15 NOTE — SIGNIFICANT EVENT
SIGNIFICANT EVENT  BRIEF BRAIN ATTACK/STROKE ALERT DOCUMENTATION:     History/Exam limitations: none.   Additional history was obtained from patient and spouse/SO.    SUBJECTIVE:     Radha Boone is a 84 y.o. 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       Last Known Well Time: 10 minutes prior to Brain attack, BG 99        ------------------------------------------------------------------------------------------------------------------------------------------     Physical Exam:    Vitals:    07/15/25 1548   BP:    Pulse: 82   Resp: (!) 34   Temp:    SpO2: 94%       Neurological Exam  Physical Exam       Time: 3:49 PM  Person Administering Scale: Marco Hamlin MD     1a  Level of consciousness: 0=alert; keenly responsive   1b. LOC questions:  0=Performs both tasks correctly   1c. LOC commands: 0=Performs both tasks correctly   2.  Best Gaze: 0=normal   3. Visual: 0=No visual loss   4. Facial Palsy: 0=Normal symmetric movement   5a. Motor left arm: 0=No drift, limb holds 90 (or 45) degrees for full 10 seconds   5b.  Motor right arm: 0=No drift, limb holds 90 (or 45) degrees for full 10 seconds   6a. motor left le=No drift, limb holds 90 (or 45) degrees for full 10 seconds   6b  Motor right le=No drift, limb holds 90 (or 45) degrees for full 10 seconds   7. Limb Ataxia: 0=Absent   8.  Sensory: 0=Normal; no sensory loss   9. Best Language:  0   10. Dysarthria: 1=Mild to moderate, patient slurs at least some words and at worst, can be understood with some difficulty   11. Extinction and Inattention: 0=No abnormality          Total:   1        ------------------------------------------------------------------------------------------------------------------------------------------     Medical Decision Making:   Not a candidate for TNK; will have CT Head       Discussion of Management with Other  Providers:   I discussed the patient/results with: Dr. Hernandez from neurology, who is present throughout brain attack    IV Thrombolysis IV Thrombolysis Checklist      IV Thrombolysis Given: No; Thrombolysis contraindication reason: Other not a TNK candidate      Plan/Recommendations:   CT Head ordered, will be followed-up by primary team, updated and present during the brain attack

## 2025-07-15 NOTE — PROGRESS NOTES
Nutrition Diet Education  Dietitian discretion, Diet education   Nutrition Note:  Radha Boone is a 84 y.o. male presenting 7/14 with   slurred speech and imbalance/abnormal gait with paresthesias to RUE; BP >200/100. Work up revealing likely TIA, left common carotid artery thrombus and stenosis of right vertebral artery. Neurology, Cardiology, and Vascular Surgery involved with pt care.     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     DIET: regular; NKFA or intolerances per pt.   LABS: T chol 144, HDL 34.7kg, LDL 88, triglyceride 105, HA1C 6.3%.    Education Documentation  Nutrition Related Education, taught by Stephanie Ramos RD at 7/15/2025  1:41 PM.  Learner: Significant Other, Patient  Readiness: Acceptance  Method: Explanation  Response: Verbalizes Understanding  Comment: Pt denied need for written material at this time.      7/15: Met with pt and wife at bedside; AYR reviewed. Pt reports to follow a no added salt diet at home and is trying to watch sodium intakes of processed foods. Encouraged continued low sodium diet for home; pt and wife deny need for written information at this time.    Follow Up:     Time Spent (min): 30 minutes  Last Date of Nutrition Visit: 07/15/25  Nutrition Follow-Up Needed?: 5-7 days  Follow up Comment: ks    Yes

## 2025-07-15 NOTE — CARE PLAN
The patient's goals for the shift include  to feel better    The clinical goals for the shift include no s/s of stroke.  SBP <220    Goals are progressing. Pt. Had a brain attack called at 15:49 due to slurred speech, pain behind eyes, and headache. Pt. NIH scale was 1 due to slurred speech. Ct of the head was negative. Pt. Has LR running at 60ml/hr to increase perfusion to brain. Per neuro, goal is to keep SBP < 220 for 24 hours.

## 2025-07-15 NOTE — H&P
Internal Medicine History and Physical    PATIENT NAME:  Radha Boone    MRN: [id number]  DATE of SERVICE: July 15, 2025    Primary Care Physician: No primary care provider on file.          Chief Complaint: Elevated blood pressure    HPI:  This is a 84 y.o. male with [place] history of hypertension, who presents with hypertensive urgency with blood pressure up to 230, patient complains of generalized weakness mild dizziness, he denies numbness or tingling, in the emergency room patient CT scan of the brain did not show any acute process, he had CT angiogram of head and neck showed right vertebral artery stenosis, MRI of the brain not show any acute process, patient admitted for further evaluation and treatment.    On today's evaluation, patient feels better, he denies dizziness, weakness, headache, chest pain or shortness of breath.    Past Medical History:  Medical History[1]    Past Surgical History:  Surgical History[2]    Family History:  Family History[3]    Social History:    Social History     Socioeconomic History    Marital status:      Spouse name: Not on file    Number of children: Not on file    Years of education: Not on file    Highest education level: Not on file   Occupational History    Not on file   Tobacco Use    Smoking status: Never    Smokeless tobacco: Never   Vaping Use    Vaping status: Never Used   Substance and Sexual Activity    Alcohol use: Not Currently    Drug use: Never    Sexual activity: Defer   Other Topics Concern    Not on file   Social History Narrative    Not on file     Social Drivers of Health     Financial Resource Strain: Low Risk  (7/13/2025)    Overall Financial Resource Strain (CARDIA)     Difficulty of Paying Living Expenses: Not hard at all   Food Insecurity: No Food Insecurity (7/13/2025)    Hunger Vital Sign     Worried About Running Out of Food in the Last Year: Never true     Ran Out of Food in the Last Year: Never true   Transportation Needs: No  Transportation Needs (7/13/2025)    PRAPARE - Transportation     Lack of Transportation (Medical): No     Lack of Transportation (Non-Medical): No   Physical Activity: Not on file   Stress: Not on file   Social Connections: Not on file   Intimate Partner Violence: Not At Risk (7/13/2025)    Humiliation, Afraid, Rape, and Kick questionnaire     Fear of Current or Ex-Partner: No     Emotionally Abused: No     Physically Abused: No     Sexually Abused: No   Housing Stability: Low Risk  (7/13/2025)    Housing Stability Vital Sign     Unable to Pay for Housing in the Last Year: No     Number of Times Moved in the Last Year: 0     Homeless in the Last Year: No         Prior to Admission Medications:  Prescriptions Prior to Admission[4]    Active orders:  Active Orders   Lab    Basic Metabolic Panel     Frequency: AM draw     Number of Occurrences: Until Specified    CBC and Auto Differential     Frequency: AM draw     Number of Occurrences: 3 Occurrences   Diet    Adult diet Regular     Frequency: Effective now     Number of Occurrences: Until Specified   Nursing    Activity (specify) Ambulate     Frequency: Until discontinued     Number of Occurrences: Until Specified    Apply sequential compression device     Frequency: Until discontinued     Number of Occurrences: Until Specified    Assess need for support surfaces     Frequency: Until discontinued     Number of Occurrences: Until Specified     Order Comments: Assess need for therapeutic bed/mattress/chair pad/bed pillows/cushions.      Assess pressure points on heels and bony prominences every shift     Frequency: Until discontinued     Number of Occurrences: Until Specified    Assist with ADL's/ toileting every 2 hours except during sleep     Frequency: Multiple - See Comments     Number of Occurrences: Until Specified    Assist with ADL's/ toileting every 4 hours while asleep     Frequency: Multiple - See Comments     Number of Occurrences: Until Specified     Elevate heels off of bed     Frequency: Until discontinued     Number of Occurrences: Until Specified    Keep skin clean, moisturized and protected from urine and stool     Frequency: Until discontinued     Number of Occurrences: Until Specified    Monitor intake and output     Frequency: q4h     Number of Occurrences: Until Specified    Neuro checks     Frequency: q4h     Number of Occurrences: 72 Hours    NIHSS     Frequency: Until discontinued     Number of Occurrences: Until Specified     Order Comments: Perform NIH Stroke Scale on admission & any time patient experiences a neurological deterioration.      Notify provider (specify parameters)     Frequency: Until discontinued     Number of Occurrences: Until Specified    Patient education     Frequency: Until discontinued     Number of Occurrences: Until Specified     Order Comments: Educate patient/caregivers on methods to prevent/treat pressure injury.      Turn side to side     Frequency: q2h     Number of Occurrences: 72 Hours     Order Comments: Change positions, shift weight, turn.      Up in chair     Frequency: Until discontinued     Number of Occurrences: Until Specified     Order Comments: Limit uninterrupted chair sitting to an hour or less.      Vital Signs     Frequency: q4h     Number of Occurrences: 72 Hours    Weigh patient     Frequency: Daily     Number of Occurrences: Until Specified     Order Comments: At 0600     Code Status    Full code     Frequency: Continuous     Number of Occurrences: Until Specified   Consult    Inpatient consult to Social Work and TCC     Frequency: Once     Number of Occurrences: 1 Occurrences    Inpatient consult to Vascular Surgery     Frequency: Once     Number of Occurrences: 1 Occurrences   Nourishments    May Not Participate in Room Service     Frequency: Once     Number of Occurrences: 1 Occurrences   PT    PT eval and treat     Frequency: Until therapy completed     Number of Occurrences: 1 Occurrences  "  Respiratory Care    Pulse oximetry, continuous     Frequency: Continuous     Number of Occurrences: Until Specified   ECG    ECG 12 Lead     Frequency: PRN     Number of Occurrences: Until Specified     Order Comments: Notify provider if performed.      Electrocardiogram, 12-lead PRN ACS symptoms     Frequency: PRN     Number of Occurrences: Until Specified    Electrocardiogram, 12-lead PRN ACS symptoms     Frequency: PRN     Number of Occurrences: Until Specified     Order Comments: Notify provider if performed.     Precaution    Aspiration precautions     Frequency: Until discontinued     Number of Occurrences: Until Specified     Order Comments: Aspiration precautions for any patient who is less than \"no deficits identified.\" Alert & upright 90 degrees for all oral intake.     Point of Care Testing - Docked Device    POCT glucose meter docked device     Frequency: 4x daily - AC and at bedtime     Number of Occurrences: 3 Days     Order Comments: Blood Glucose POCT Before Meals & Bedtime, if eating meals Conditional Order: If Fasting Glucose is less than 120 mg/dL and random glucose is less than 180 mg/dL for 24 hours, then discontinue POC testing.       Vascular Ultrasound    Vascular US Carotid Artery Duplex Bilateral     Frequency: Once     Number of Occurrences: 1 Occurrences   Telemetry    Telemetry monitoring     Frequency: Until discontinued     Number of Occurrences: 3 Days   Medications    aspirin EC tablet 81 mg     Frequency: Nightly     Dose: 81 mg     Route: oral    atorvastatin (Lipitor) tablet 80 mg     Frequency: Nightly     Dose: 80 mg     Route: oral    insulin lispro injection 0-5 Units     Frequency: q4h     Dose: 0-5 Units     Route: subcutaneous    labetaloL (Normodyne,Trandate) injection 10 mg     Frequency: q10 min PRN     Dose: 10 mg     Route: intravenous    Non-Formulary Medication     Frequency: BID     Dose: 1 each     Route: oral    oxygen (O2) therapy     Frequency: Continuous PRN " - O2/gases     Route: inhalation    potassium chloride CR (Klor-Con M20) ER tablet 20 mEq     Frequency: Once     Dose: 20 mEq     Route: oral    valsartan (Diovan) tablet 320 mg     Frequency: Daily     Dose: 320 mg     Route: oral           Allergies:   Allergies[5]    Review of Systems:  A 10 systems review of systems is negative except for HPI.      Vitals:  Patient Vitals for the past 24 hrs:   BP Temp Temp src Pulse Resp SpO2 Weight   07/15/25 0800 -- -- -- 72 24 -- --   07/15/25 0734 (!) 192/105 36.5 °C (97.7 °F) -- 74 19 93 % --   07/15/25 0427 (!) 168/91 36.3 °C (97.3 °F) Temporal 64 21 95 % 78.9 kg (174 lb)   07/15/25 0400 -- -- -- 62 -- -- --   07/15/25 0029 157/79 36.6 °C (97.9 °F) Temporal 68 21 94 % --   07/15/25 0000 -- -- -- 64 18 -- --   07/14/25 2000 -- -- -- 70 19 -- --   07/14/25 1945 (!) 185/93 36.9 °C (98.4 °F) Temporal 74 26 94 % --   07/14/25 1841 (!) 196/102 36.8 °C (98.2 °F) Temporal 76 18 96 % --   07/14/25 1800 (!) 188/91 -- -- 78 -- -- --   07/14/25 1700 (!) 181/87 -- -- 64 -- -- --   07/14/25 1600 169/90 36.4 °C (97.5 °F) -- 62 -- 97 % --   07/14/25 1500 164/76 -- -- 66 -- -- --   07/14/25 1400 (!) 211/110 -- -- 74 18 98 % --   07/14/25 1310 (!) 210/102 -- -- 68 (!) 38 97 % --   07/14/25 1100 (!) 189/114 -- -- 70 (!) 32 96 % --   07/14/25 1000 (!) 194/84 -- -- 58 12 93 % --     Body mass index is 24.27 kg/m².         Physical Exam:  General appearance: Well-appearing alert, in no acute distress   Skin: Skin color, texture, turgor normal, no suspicious rashes or lesions  Head: normal  Eyes: Anicteric sclera. Extraocular movements are intact.   Ears: external ears normal  Oropharynx: Lips, mucosa, and tongue normal  Neck: Supple, no adenopathy; thyroid symmetric, normal size, no bruits  Lungs: Clear to auscultation, no wheezing or rhonchi  Heart: RRR without murmur, gallop, or rubs.  No ectopy  Abdomen: Soft, non-tender. Bowel sounds normal. No masses, organomegaly  Extremities: No  deformity, no edema  Neuro: Alert oriented x3, no focal deficit.      Labs:  Results for orders placed or performed during the hospital encounter of 07/13/25 (from the past 24 hours)   Protime-INR   Result Value Ref Range    Protime 24.0 (H) 9.8 - 12.4 seconds    INR 2.2 (H) 0.9 - 1.1   Transthoracic Echo Complete   Result Value Ref Range    AV pk blanka 1.44 m/s    LV Biplane EF 62 %    LVOT diam 2.01 cm    MV E/A ratio 0.54     Tricuspid annular plane systolic excursion 2.6 cm    LA vol index A/L 36.5 ml/m2    LV EF 62 %    RV free wall pk S' 13.08 cm/s    RVSP 37 mmHg    LVIDd 4.47 cm    Aortic Valve Area by Continuity of Peak Velocity 2.09 cm2    AV pk grad 8 mmHg    LV A4C EF 47.0    POCT GLUCOSE   Result Value Ref Range    POCT Glucose 132 (H) 74 - 99 mg/dL   POCT GLUCOSE   Result Value Ref Range    POCT Glucose 131 (H) 74 - 99 mg/dL   POCT GLUCOSE   Result Value Ref Range    POCT Glucose 171 (H) 74 - 99 mg/dL   POCT GLUCOSE   Result Value Ref Range    POCT Glucose 105 (H) 74 - 99 mg/dL   POCT GLUCOSE   Result Value Ref Range    POCT Glucose 111 (H) 74 - 99 mg/dL   CBC and Auto Differential   Result Value Ref Range    WBC 8.0 4.4 - 11.3 x10*3/uL    nRBC 0.0 0.0 - 0.0 /100 WBCs    RBC 4.68 4.50 - 5.90 x10*6/uL    Hemoglobin 13.8 13.5 - 17.5 g/dL    Hematocrit 42.8 41.0 - 52.0 %    MCV 92 80 - 100 fL    MCH 29.5 26.0 - 34.0 pg    MCHC 32.2 32.0 - 36.0 g/dL    RDW 13.1 11.5 - 14.5 %    Platelets 195 150 - 450 x10*3/uL    Neutrophils % 58.6 40.0 - 80.0 %    Immature Granulocytes %, Automated 0.5 0.0 - 0.9 %    Lymphocytes % 25.4 13.0 - 44.0 %    Monocytes % 10.8 2.0 - 10.0 %    Eosinophils % 3.9 0.0 - 6.0 %    Basophils % 0.8 0.0 - 2.0 %    Neutrophils Absolute 4.68 1.60 - 5.50 x10*3/uL    Immature Granulocytes Absolute, Automated 0.04 0.00 - 0.50 x10*3/uL    Lymphocytes Absolute 2.03 0.80 - 3.00 x10*3/uL    Monocytes Absolute 0.86 (H) 0.05 - 0.80 x10*3/uL    Eosinophils Absolute 0.31 0.00 - 0.40 x10*3/uL     "Basophils Absolute 0.06 0.00 - 0.10 x10*3/uL   Basic Metabolic Panel   Result Value Ref Range    Glucose 116 (H) 74 - 99 mg/dL    Sodium 140 136 - 145 mmol/L    Potassium 3.7 3.5 - 5.3 mmol/L    Chloride 107 98 - 107 mmol/L    Bicarbonate 25 21 - 32 mmol/L    Anion Gap 12 10 - 20 mmol/L    Urea Nitrogen 26 (H) 6 - 23 mg/dL    Creatinine 1.12 0.50 - 1.30 mg/dL    eGFR 65 >60 mL/min/1.73m*2    Calcium 8.5 (L) 8.6 - 10.3 mg/dL   POCT GLUCOSE   Result Value Ref Range    POCT Glucose 115 (H) 74 - 99 mg/dL         Imaging:   Reviewed          Assessment/Plan:  Assessment & Plan  TIA (transient ischemic attack)  Patient admitted to stepdown unit  Continue aspirin, Lipitor  PT/OT  Mixed hyperlipidemia  Continue atorvastatin  Hypertension  Patient had hypertensive urgency was restarted on valsartan, will monitor blood pressure  Asymptomatic stenosis of right vertebral artery  Continue aspirin and Lipitor  Consult vascular surgery      DVT Prophylaxis- Addressed    Discussed with patient, RN    SIGNATURE: Kingsley Fisher MD  DATE: July 15, 2025  TIME: 9:06 AM      This note was partially created using voice recognition software and is inherently subject to errors including those of syntax and \"sound-alike\" substitutions which may escape proofreading. 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    Flatulence 04/24/2015    Flatulence symptom    Hypertension     Orthostatic hypotension 03/15/2019    Primary orthostatic hypotension    Other abnormal glucose 10/21/2014    Abnormal glucose    Personal history of other diseases of the nervous system " and sense organs 10/21/2014    History of glaucoma    Personal history of other endocrine, nutritional and metabolic disease     History of hypercholesterolemia    Personal history of transient ischemic attack (TIA), and cerebral infarction without residual deficits 03/15/2019    History of stroke    Stroke (Multi)     Transient global amnesia 12/04/2013    Transient global amnesia   [2]   Past Surgical History:  Procedure Laterality Date    CATARACT EXTRACTION  07/27/2016    Cataract Surgery    COLONOSCOPY  07/16/2019    Complete Colonoscopy    COLONOSCOPY  07/16/2019    Complete Colonoscopy    HERNIA REPAIR  03/27/2014    Hernia Repair    OTHER SURGICAL HISTORY  07/16/2019    Endoscopy   [3] No family history on file.  [4]   Medications Prior to Admission   Medication Sig Dispense Refill Last Dose/Taking    hydrALAZINE (Apresoline) 25 mg tablet Take 1 tablet (25 mg) by mouth 2 times a day.   7/13/2025    pravastatin (Pravachol) 20 mg tablet Take 1 tablet (20 mg) by mouth once daily at bedtime.   7/12/2025 Bedtime    valsartan (Diovan) 320 mg tablet Take 1 tablet (320 mg) by mouth once daily at bedtime.   7/12/2025 Bedtime    vit C/E/Zn/coppr/lutein/zeaxan (PRESERVISION AREDS-2 ORAL) Take 1 capsule by mouth 2 times a day.   7/13/2025    warfarin (Coumadin) 5 mg tablet Take 0.5 tablets (2.5 mg) by mouth once a day on Sunday, Tuesday, Thursday, and Saturday. evening   7/12/2025 Evening    aspirin 81 mg EC tablet Take 1 tablet (81 mg) by mouth once daily at bedtime.       warfarin (Coumadin) 5 mg tablet Take 1 tablet (5 mg) by mouth once a day on Monday, Wednesday, and Friday. evening   7/11/2025 Evening   [5]   Allergies  Allergen Reactions    Amlodipine Itching and Rash    Epinephrine-Chlorpheniramine Unknown

## 2025-07-15 NOTE — PROGRESS NOTES
"Radha Boone is a 84 y.o. male on day 2 of admission presenting with Cerebrovascular accident (CVA) due to stenosis of right vertebral artery (Multi).      Subjective   No overnight events       Objective     Last Recorded Vitals  Blood pressure (!) 192/97, pulse 72, temperature 36.5 °C (97.7 °F), resp. rate 21, height 1.803 m (5' 11\"), weight 78.9 kg (174 lb), SpO2 96%.    Physical Exam  Neurological Exam    Gen: NAD  Neuro:  --HIF: A&O X 3, repetition and naming intact  --CN:  PERRLA, EOMI, VFF, no visible facial asymmetry, facial sensation intact, no tongue or palatal deviation, SCM intact  --Motor: Moves all 4 extremities equally; no focal deficits  --Sensory: Intact to light touch, intact to pinprick  --Reflex: 2+ symmetric, toes down  --Cerebellum: FTN and HTS intact  --Gait: Deferred     Relevant Results        NIH Stroke Scale  1A. Level of Consciousness: Alert, Keenly Responsive  1B. Ask Month and Age: Both Questions Right  1C. Blink Eyes & Squeeze Hands: Performs Both Tasks  2. Best Gaze: Normal  3. Visual: No Visual Loss  4. Facial Palsy: Normal Symmetrical Movements  5A. Motor - Left Arm: No Drift  5B. Motor - Right Arm: No Drift  6A. Motor - Left Leg: No Drift  6B. Motor - Right Leg: No Drift  7. Limb Ataxia: Absent  8. Sensory Loss: Normal  9. Best Language: No Aphasia  10. Dysarthria: Normal  11. Extinction and Inattention: No Abnormality  NIH Stroke Scale: 0           [place] Coma Scale  Best Eye Response: Spontaneous  Best Verbal Response: Oriented  Best Motor Response: Follows commands  [place] Coma Scale Score: 15          MRI Brain (I personally reviewed the images/tracings with the following interpretation)  No evidence of acute stroke                   This patient currently has cardiac telemetry ordered; if you would like to modify or discontinue the telemetry order, click here to go to the orders activity to modify/discontinue the order.  Assessment & Plan  Cerebrovascular accident (CVA) due " to stenosis of right vertebral artery (Multi)     Episode of Dysarthria - suspect TIA, Intracranial Antheroscleosis  - MRI Brain negative  - recommend continuing ASA 81 mg/day + Plavix 75 mg/day for 90 days  - control of vascular risk factors:   - long term goal BP < 130/80   - goal LDL < 70; continue statin    2.  Left Common Carotid Artery Thrombus  - no anticoagulation required per vascular surgery  TIA (transient ischemic attack)    Mixed hyperlipidemia    Hypertension    Asymptomatic stenosis of right vertebral artery    No further neurological workup,      Cirilo Hernandez MD      Addendum:  I was called because patient developed slurred speech again.    Code BAT was called.    I came to the bedside.     NIHSS was 1 (for mild dysarthria).    Recommend repeat CT Head to rule out ICH  IVF to maximize perfusion to the brain - 0.9 NS 60 ml/hr    Cirilo Hernandez MD  [facility]  Department of Neurology

## 2025-07-15 NOTE — CONSULTS
Inpatient consult to Vascular Surgery  Consult performed by: RODNEY Andrade-CNP  Consult ordered by: RODNEY Morales-CNP  Reason for consult: hx of left CCA thrombus, AC recommendations        History Of Present Illness:    Radha Boone is a 84 y.o. male presented to [facility] ED with slurred speech and imbalance/abnormal gait with paresthesias to RUE. Hypertensive >200/100.  Symptoms resolved with antihypertensive treatment in ED per patient. CTA head/neck High-grade/marked stenosis more likely than focal occlusion of the right V4 vertebral artery. Moderate focal stenosis of the mid to distal basilar artery. Flattening of the medial contour of the left common carotid artery over greater than 4 cm segment could be on the basis of soft atherosclerotic plaque or perhaps underlying dissection in this region. 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 Vascular Surgery consulted.     Patient presents ambulating unassisted in room after using restroom. Denies any further signs or symptoms of transient ischemic attack. Denies dysphasia, dysphagia, or dysarthria. Denies hemiparesis or hemisensory loss. Denies any focal neurologic changes. Denies transient blindness. States he had run out of Carvedilol for the last 2-3 months prior to presentation and his Sbp had been consistently ~160s.     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    Past Surgical History:  He has a past surgical history that includes Other surgical history (07/16/2019); Colonoscopy (07/16/2019); Colonoscopy (07/16/2019); Cataract extraction (07/27/2016); and Hernia repair (03/27/2014).      Social History:  He reports that he has never smoked. He has never used smokeless tobacco. He reports that he does not currently use alcohol. He reports that he does not use drugs.    Family History:  Family History[1]    Last Recorded Vitals:  Vitals:    07/15/25 0734 07/15/25 0800 07/15/25 1128 07/15/25 1200   BP: (!) 192/105  (!) 203/108 (!) 192/97   BP Location:       Patient Position:       Pulse: 74 72 74 72   Resp: 19 24 25 21   Temp: 36.5 °C (97.7 °F)  36.5 °C (97.7 °F)    TempSrc:       SpO2: 93%  96%    Weight:       Height:         Last I/O:  I/O last 3 completed shifts:  In: 1380 (17.5 mL/kg) [P.O.:1380]  Out: - (0 mL/kg)   Weight: 78.9 kg     Physical Exam  Vitals reviewed.   Constitutional:       General: He is awake.      Appearance: Normal appearance. He is normal weight.   HENT:      Head: Normocephalic.      Nose: Nose normal.      Mouth/Throat:      Mouth: Mucous membranes are moist.   Eyes:      Conjunctiva/sclera: Conjunctivae normal.   Neck:      Vascular: No carotid bruit.   Cardiovascular:      Rate and Rhythm: Normal rate and regular rhythm.      Pulses:           Radial pulses are 2+ on the right side and 2+ on the left side.   Pulmonary:      Effort: Pulmonary effort is normal.      Breath sounds: Normal breath sounds.   Skin:     General: Skin is warm and dry.   Neurological:      Mental Status: He is alert and oriented to person, place, and time.      Cranial Nerves: No cranial nerve deficit, dysarthria or facial  asymmetry.   Psychiatric:         Mood and Affect: Mood normal.         Behavior: Behavior is cooperative.       Last Labs:  CBC - 7/15/2025:  5:46 AM  8.0 13.8 195    42.8      CMP - 7/15/2025:  5:46 AM  8.5 6.2 13 --- 0.6   2.7 3.8 11 46      PTT - 7/13/2025:  5:33 PM  2.2   24.0 36     Troponin I, High Sensitivity   Date/Time Value Ref Range Status   07/13/2025 05:33 PM 17 0 - 20 ng/L Final     BNP   Date/Time Value Ref Range Status   07/13/2025 05:33 PM 75 0 - 99 pg/mL Final     Hemoglobin A1C   Date/Time Value Ref Range Status   07/13/2025 05:33 PM 6.3 (H) See comment % Final   03/15/2019 10:54 AM 6.1 % Final     Comment:          Diagnosis of Diabetes-Adults   Non-Diabetic: < or = 5.6%   Increased risk for developing diabetes: 5.7-6.4%   Diagnostic of diabetes: > or = 6.5%  .       Monitoring of Diabetes                Age (y)     Therapeutic Goal (%)   Adults:          >18           <7.0   Pediatrics:    13-18           <7.5                   7-12           <8.0                   0- 6            7.5-8.5   American Diabetes Association. Diabetes Care 33(S1), Jan 2010.     09/14/2018 10:30 AM 6.1 % Final     Comment:          Diagnosis of Diabetes-Adults   Non-Diabetic: < or = 5.6%   Increased risk for developing diabetes: 5.7-6.4%   Diagnostic of diabetes: > or = 6.5%  .       Monitoring of Diabetes                Age (y)     Therapeutic Goal (%)   Adults:          >18           <7.0   Pediatrics:    13-18           <7.5                   7-12           <8.0                   0- 6            7.5-8.5   American Diabetes Association. Diabetes Care 33(S1), Jan 2010.       LDL Calculated   Date/Time Value Ref Range Status   07/13/2025 05:33 PM 88 <=99 mg/dL Final     Comment:                                 Near   Borderline      AGE      Desirable  Optimal    High     High     Very High     0-19 Y     0 - 109     ---    110-129   >/= 130     ----    20-24 Y     0 - 119     ---    120-159   >/= 160     ----       >24 Y     0 -  99   100-129  130-159   160-189     >/=190    LDL Cholesterol is calculated using the Friedewald equation.     VLDL   Date/Time Value Ref Range Status   07/13/2025 05:33 PM 21 0 - 40 mg/dL Final   09/14/2018 10:30 AM 20 0 - 40 mg/dL Final   04/06/2018 12:42 PM 14 0 - 40 mg/dL Final      Diagnostic Imaging:   STUDY:  CT BRAIN ATTACK ANGIO HEAD AND NECK W AND WO IV CONTRAST;  7/13/2025  5:41 pm      INDICATION:  Signs/Symptoms:RLE drift, slurred speech, ataxia, HTN.          COMPARISON:  Noncontrast head CT 5:33 p.m..      ACCESSION NUMBER(S):  [id number]      ORDERING CLINICIAN:  JOE GUILLAUME      TECHNIQUE:  60 ML of Omnipaque 350 was administered intravenously and axial  images of the head and neck were acquired.  Coronal, sagittal, and  3-D reconstructions were provided for review.      FINDINGS:  Findings CT angiography neck:  Origins of the great vessels are patent. The right common carotid  artery is patent. There is subtle crescentic flattening of the medial  aspect of the lumen of the left common carotid artery for instance on  image 277 series 402 extending over a span of approximately 4.4 cm  which could reflect asymmetric soft plaque versus dissection in this  region. There significant atherosclerotic change in the region of the  carotid bifurcations with likely mixed soft plaque and  atherosclerotic calcification less than 50% stenosis by NASCET  criteria although subjectively a higher degree of stenosis is noted  particularly on the right side for instance on image 372 series 402.  The cervical internal carotid arteries are otherwise patent.  Vertebral arteries are patent in the region of the neck. Limited  examination through the lung apices is grossly unremarkable.      Findings CT angiography head:  Distal internal carotid arteries are patent with 0.3 cm aneurysm or  infundibulum of the right supraclinoid internal carotid artery noted  for instance on image 623 of series 402.  Proximal anterior and middle  cerebral arteries are patent. There is high-grade stenosis more  likely than focal occlusion of the V4 segment of the right vertebral  artery on image 518 402 with distal filling and filling of the  proximal right posteroinferior cerebellar artery. Less than 50% focal  stenosis of the left V4 segment by NASCET criteria. The basilar  artery is patent with moderate focal thickening in the midportion.  The proximal posterior cerebral arteries are patent. Posterior  communicating arteries are not clearly visualized.      IMPRESSION:  High-grade/marked stenosis more likely than focal occlusion of the  right V4 vertebral artery. Moderate focal stenosis of the mid to  distal basilar artery. MRI would have greater sensitivity and  specificity for recent ischemia if clinically suspected.      Flattening of the medial contour of the left common carotid artery  over greater than 4 cm segment could be on the basis of soft  atherosclerotic plaque or perhaps underlying dissection in this  region.      Prominent atherosclerotic changes of the carotid bifurcations more on  the right side without however hemodynamically significant stenosis  by NASCET criteria      0.3 cm aneurysm or infundibulum of the right supraclinoid internal  carotid artery.    Impression    Estimated range of stenosis*:    Right carotid system estimated stenosis: hemodynamically significant, shadowing plaque may obscure accurate assessment.  Left  carotid system estimated stenosis: Not hemodynamically significant.    COMMENT:  As clinically indicated carotid CTA may be of assistance for further characterization.    *COMMENT:  These estimates represent a median value within a 95% confidence interval range.  They represent percent diameter ICA stenosis derived from regression curve analysis using the NASCET method and Doppler ultrasound velocities.  Please note with high-grade stenosis (greater than 95%), an actual reduction in  velocity will occur.  Reference:  Nils Montoya. carotid ultrasound, in RAD CLIN NA, 39 (3), May, 2001.              TRANSCRIBED BY:        ELECTRONICALLY SIGNED BY: Nils Soni MD  Narrative    FINDINGS:  Right (% stenosis)    ICA Peak Systolic Velocity 155  50% (cm/sec)          ICA/CCA Systolic Ratio 2.9  63%        Left  (%stenosis)  ICA Peak Systolic Velocity 90 normal  (cm/sec)          ICA/CCA Systolic Ratio  1.0 normal  BILATERAL CAROTID SYSTEMS:    Bilateral calcified shadowing proximal ICA plaque may inhibit accurate assessment for stenosis. Right sided proximal ICA ratio and systolic values suggests presence of significant stenosis.    Both vertebral arteries have normal cephalad-directed flow.  Exam End: 06/05/24 12:49    Specimen Collected: 06/05/24 13:05      Allergies:  Amlodipine and Epinephrine-chlorpheniramine    Inpatient Medications:  Scheduled Medications[2]  PRN Medications[3]  Continuous Medications[4]  Outpatient Medications:  Current Outpatient Medications   Medication Instructions    aspirin 81 mg, oral, Nightly    hydrALAZINE (APRESOLINE) 25 mg, 2 times daily    pravastatin (PRAVACHOL) 20 mg, Nightly    valsartan (DIOVAN) 320 mg, Nightly    vit C/E/Zn/coppr/lutein/zeaxan (PRESERVISION AREDS-2 ORAL) 1 capsule, 2 times daily    warfarin (COUMADIN) 5 mg, oral, Every Mon/Wed/Fri, evening    warfarin (COUMADIN) 2.5 mg, Every Sun/Tues/Thur/Sat        Assessment/Plan   Hx of Left CCA Thrombus  Presented with slurred speech, imbalance & impaired gait with RUE paresthesias Denies recurrence/additional signs or symptoms of transient ischemic attack. Denies dysphasia, dysphagia, or dysarthria. Denies hemiparesis or hemisensory loss. Denies any focal neurologic changes. Denies transient blindness.   No carotid bruit on exam. No focal neurologic deficits.   CTA head/neck 7/13/25 High-grade/marked stenosis more likely than focal occlusion of the right V4 vertebral artery. Moderate focal stenosis  of the mid to distal basilar artery. Flattening of the medial contour of the left common carotid artery over greater than 4 cm segment could be on the basis of soft atherosclerotic plaque or perhaps underlying dissection in this region. 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   Previous Vas US Carotid Duplex with right carotid artery stenosis. Order for Vascular Carotid US.   Continue Aspirin 81mg every day and Atorvastatin 80mg at bedtime.   Reviewed with Dr. Felicity Ferrell, no vascular indication for anticoagulation at this time. Would defer to Cardiology for any additional indication for anticoagulation at this time.     I spent 35 minutes in the professional and overall care of this patient.    RODNEY Andrade-CNP  Vascular Surgery   [facility]  [facility]        [1] No family history on file.  [2]   Scheduled medications   Medication Dose Route Frequency    aspirin  81 mg oral Nightly    atorvastatin  80 mg oral Nightly    insulin lispro  0-5 Units subcutaneous q4h    valsartan  320 mg oral Daily   [3]   PRN medications   Medication    hydrALAZINE    labetaloL    oxygen   [4]   Continuous Medications   Medication Dose Last Rate

## 2025-07-16 LAB
ANION GAP SERPL CALC-SCNC: 10 MMOL/L (ref 10–20)
BASOPHILS # BLD AUTO: 0.06 X10*3/UL (ref 0–0.1)
BASOPHILS NFR BLD AUTO: 0.8 %
BUN SERPL-MCNC: 23 MG/DL (ref 6–23)
CALCIUM SERPL-MCNC: 8.3 MG/DL (ref 8.6–10.3)
CHLORIDE SERPL-SCNC: 108 MMOL/L (ref 98–107)
CO2 SERPL-SCNC: 25 MMOL/L (ref 21–32)
CREAT SERPL-MCNC: 1.13 MG/DL (ref 0.5–1.3)
EGFRCR SERPLBLD CKD-EPI 2021: 64 ML/MIN/1.73M*2
EOSINOPHIL # BLD AUTO: 0.27 X10*3/UL (ref 0–0.4)
EOSINOPHIL NFR BLD AUTO: 3.5 %
ERYTHROCYTE [DISTWIDTH] IN BLOOD BY AUTOMATED COUNT: 13 % (ref 11.5–14.5)
GLUCOSE BLD MANUAL STRIP-MCNC: 108 MG/DL (ref 74–99)
GLUCOSE BLD MANUAL STRIP-MCNC: 185 MG/DL (ref 74–99)
GLUCOSE BLD MANUAL STRIP-MCNC: 209 MG/DL (ref 74–99)
GLUCOSE BLD MANUAL STRIP-MCNC: 95 MG/DL (ref 74–99)
GLUCOSE SERPL-MCNC: 107 MG/DL (ref 74–99)
HCT VFR BLD AUTO: 42.8 % (ref 41–52)
HGB BLD-MCNC: 13.7 G/DL (ref 13.5–17.5)
IMM GRANULOCYTES # BLD AUTO: 0.04 X10*3/UL (ref 0–0.5)
IMM GRANULOCYTES NFR BLD AUTO: 0.5 % (ref 0–0.9)
LYMPHOCYTES # BLD AUTO: 2.14 X10*3/UL (ref 0.8–3)
LYMPHOCYTES NFR BLD AUTO: 27.9 %
MCH RBC QN AUTO: 29.2 PG (ref 26–34)
MCHC RBC AUTO-ENTMCNC: 32 G/DL (ref 32–36)
MCV RBC AUTO: 91 FL (ref 80–100)
MONOCYTES # BLD AUTO: 0.76 X10*3/UL (ref 0.05–0.8)
MONOCYTES NFR BLD AUTO: 9.9 %
NEUTROPHILS # BLD AUTO: 4.41 X10*3/UL (ref 1.6–5.5)
NEUTROPHILS NFR BLD AUTO: 57.4 %
NRBC BLD-RTO: 0 /100 WBCS (ref 0–0)
PLATELET # BLD AUTO: 209 X10*3/UL (ref 150–450)
POTASSIUM SERPL-SCNC: 3.5 MMOL/L (ref 3.5–5.3)
RBC # BLD AUTO: 4.69 X10*6/UL (ref 4.5–5.9)
SODIUM SERPL-SCNC: 139 MMOL/L (ref 136–145)
WBC # BLD AUTO: 7.7 X10*3/UL (ref 4.4–11.3)

## 2025-07-16 PROCEDURE — 36415 COLL VENOUS BLD VENIPUNCTURE: CPT | Performed by: NURSE PRACTITIONER

## 2025-07-16 PROCEDURE — 80048 BASIC METABOLIC PNL TOTAL CA: CPT | Performed by: NURSE PRACTITIONER

## 2025-07-16 PROCEDURE — 82947 ASSAY GLUCOSE BLOOD QUANT: CPT

## 2025-07-16 PROCEDURE — 2500000001 HC RX 250 WO HCPCS SELF ADMINISTERED DRUGS (ALT 637 FOR MEDICARE OP): Performed by: NURSE PRACTITIONER

## 2025-07-16 PROCEDURE — 2500000002 HC RX 250 W HCPCS SELF ADMINISTERED DRUGS (ALT 637 FOR MEDICARE OP, ALT 636 FOR OP/ED): Performed by: NURSE PRACTITIONER

## 2025-07-16 PROCEDURE — 2060000001 HC INTERMEDIATE ICU ROOM DAILY

## 2025-07-16 PROCEDURE — 99232 SBSQ HOSP IP/OBS MODERATE 35: CPT | Performed by: PSYCHIATRY & NEUROLOGY

## 2025-07-16 PROCEDURE — 85025 COMPLETE CBC W/AUTO DIFF WBC: CPT | Performed by: NURSE PRACTITIONER

## 2025-07-16 PROCEDURE — 2500000001 HC RX 250 WO HCPCS SELF ADMINISTERED DRUGS (ALT 637 FOR MEDICARE OP): Performed by: PHYSICIAN ASSISTANT

## 2025-07-16 PROCEDURE — 2500000001 HC RX 250 WO HCPCS SELF ADMINISTERED DRUGS (ALT 637 FOR MEDICARE OP): Performed by: PSYCHIATRY & NEUROLOGY

## 2025-07-16 RX ORDER — POTASSIUM CHLORIDE 20 MEQ/1
40 TABLET, EXTENDED RELEASE ORAL ONCE
Status: COMPLETED | OUTPATIENT
Start: 2025-07-16 | End: 2025-07-16

## 2025-07-16 RX ORDER — HYDRALAZINE HYDROCHLORIDE 25 MG/1
25 TABLET, FILM COATED ORAL 2 TIMES DAILY
Status: DISCONTINUED | OUTPATIENT
Start: 2025-07-16 | End: 2025-07-18 | Stop reason: HOSPADM

## 2025-07-16 RX ADMIN — HYDRALAZINE HYDROCHLORIDE 25 MG: 25 TABLET ORAL at 20:49

## 2025-07-16 RX ADMIN — ASPIRIN 81 MG CHEWABLE TABLET 81 MG: 81 TABLET CHEWABLE at 08:31

## 2025-07-16 RX ADMIN — INSULIN LISPRO 1 UNITS: 100 INJECTION, SOLUTION INTRAVENOUS; SUBCUTANEOUS at 13:00

## 2025-07-16 RX ADMIN — ATORVASTATIN CALCIUM 80 MG: 80 TABLET, FILM COATED ORAL at 20:45

## 2025-07-16 RX ADMIN — POTASSIUM CHLORIDE EXTENDED-RELEASE 40 MEQ: 1500 TABLET ORAL at 08:30

## 2025-07-16 RX ADMIN — VALSARTAN 320 MG: 320 TABLET, FILM COATED ORAL at 08:31

## 2025-07-16 RX ADMIN — CLOPIDOGREL 75 MG: 75 TABLET ORAL at 08:30

## 2025-07-16 RX ADMIN — INSULIN LISPRO 2 UNITS: 100 INJECTION, SOLUTION INTRAVENOUS; SUBCUTANEOUS at 20:45

## 2025-07-16 ASSESSMENT — PAIN - FUNCTIONAL ASSESSMENT
PAIN_FUNCTIONAL_ASSESSMENT: 0-10

## 2025-07-16 ASSESSMENT — PAIN SCALES - GENERAL
PAINLEVEL_OUTOF10: 0 - NO PAIN

## 2025-07-16 NOTE — ASSESSMENT & PLAN NOTE
Episode of Dysarthria - suspect TIA, Intracranial Antheroscleosis  - MRI Brain negative  - recommend continuing ASA 81 mg/day + Plavix 75 mg/day for 90 days  - control of vascular risk factors:   - long term goal BP < 130/80   - goal LDL < 70; continue statin  - Discontinue IV fluids; gradual lowering of blood pressure to normotensive  - Follow up with RODNEY Castro-CNP in 6 to 8 weeks.    2.  Left Common Carotid Artery Thrombus  - no anticoagulation required per vascular surgery

## 2025-07-16 NOTE — CARE PLAN
Problem: PT Problem  Goal: Balance  Description: Pt will improve dynamic standing balance to Good in order to improve safety with functional tasks.    Outcome: Progressing  Goal: Transfers  Description: Pt will transfer sit<>stand with IND    Outcome: Progressing  Goal: Gait  Description: Pt will  Feet with no assistive device And IND, demonstrating safety with 180 degree turns.    Outcome: Progressing

## 2025-07-16 NOTE — PROGRESS NOTES
"Radha Boone is a 84 y.o. male on day 3 of admission presenting with Cerebrovascular accident (CVA) due to stenosis of right vertebral artery (Multi).    Subjective   No further slurred speech  No overnight events.       Objective     Last Recorded Vitals  Blood pressure (!) 209/119, pulse 89, temperature 36.7 °C (98.1 °F), resp. rate 20, height 1.803 m (5' 11\"), weight 78.5 kg (173 lb), SpO2 96%.    NIHSS     Physical Exam    Eyes:      Extraocular Movements: Extraocular movements intact.      Pupils: Pupils are equal, round, and reactive to light.       Neurological:      Motor: Motor strength is normal.    Psychiatric:         Speech: Speech normal.       Neurological Exam  Mental Status  Awake, alert and oriented to person, place and time. Speech is normal. Language is fluent with no aphasia.    Cranial Nerves  CN II: Visual fields full to confrontation.  CN III, IV, VI: Extraocular movements intact bilaterally. Pupils equal round and reactive to light bilaterally.  CN V: Facial sensation is normal.  CN VII: Full and symmetric facial movement.  CN VIII: Hearing is normal.  CN IX, X: Palate elevates symmetrically  CN XI: Shoulder shrug strength is normal.  CN XII: Tongue midline without atrophy or fasciculations.    Motor   Strength is 5/5 throughout all four extremities.    Sensory  Light touch is normal in upper and lower extremities.     Coordination  Right: Finger-to-nose normal.Left: Finger-to-nose normal.    Relevant Results    NIH Stroke Scale  1A. Level of Consciousness: Alert, Keenly Responsive  1B. Ask Month and Age: Both Questions Right  1C. Blink Eyes & Squeeze Hands: Performs Both Tasks  2. Best Gaze: Normal  3. Visual: No Visual Loss  4. Facial Palsy: Normal Symmetrical Movements  5A. Motor - Left Arm: No Drift  5B. Motor - Right Arm: No Drift  6A. Motor - Left Leg: No Drift  6B. Motor - Right Leg: No Drift  7. Limb Ataxia: Absent  8. Sensory Loss: Normal  9. Best Language: No Aphasia  10. " Dysarthria: Normal  11. Extinction and Inattention: No Abnormality  NIH Stroke Scale: 0           Scheduled medications  Scheduled Medications[1]  Continuous medications  Continuous Medications[2]  PRN medications  PRN Medications[3]  Results for orders placed or performed during the hospital encounter of 07/13/25 (from the past 24 hours)   POCT GLUCOSE   Result Value Ref Range    POCT Glucose 99 74 - 99 mg/dL   POCT GLUCOSE   Result Value Ref Range    POCT Glucose 93 74 - 99 mg/dL   POCT GLUCOSE   Result Value Ref Range    POCT Glucose 217 (H) 74 - 99 mg/dL   POCT GLUCOSE   Result Value Ref Range    POCT Glucose 95 74 - 99 mg/dL   CBC and Auto Differential   Result Value Ref Range    WBC 7.7 4.4 - 11.3 x10*3/uL    nRBC 0.0 0.0 - 0.0 /100 WBCs    RBC 4.69 4.50 - 5.90 x10*6/uL    Hemoglobin 13.7 13.5 - 17.5 g/dL    Hematocrit 42.8 41.0 - 52.0 %    MCV 91 80 - 100 fL    MCH 29.2 26.0 - 34.0 pg    MCHC 32.0 32.0 - 36.0 g/dL    RDW 13.0 11.5 - 14.5 %    Platelets 209 150 - 450 x10*3/uL    Neutrophils % 57.4 40.0 - 80.0 %    Immature Granulocytes %, Automated 0.5 0.0 - 0.9 %    Lymphocytes % 27.9 13.0 - 44.0 %    Monocytes % 9.9 2.0 - 10.0 %    Eosinophils % 3.5 0.0 - 6.0 %    Basophils % 0.8 0.0 - 2.0 %    Neutrophils Absolute 4.41 1.60 - 5.50 x10*3/uL    Immature Granulocytes Absolute, Automated 0.04 0.00 - 0.50 x10*3/uL    Lymphocytes Absolute 2.14 0.80 - 3.00 x10*3/uL    Monocytes Absolute 0.76 0.05 - 0.80 x10*3/uL    Eosinophils Absolute 0.27 0.00 - 0.40 x10*3/uL    Basophils Absolute 0.06 0.00 - 0.10 x10*3/uL   Basic Metabolic Panel   Result Value Ref Range    Glucose 107 (H) 74 - 99 mg/dL    Sodium 139 136 - 145 mmol/L    Potassium 3.5 3.5 - 5.3 mmol/L    Chloride 108 (H) 98 - 107 mmol/L    Bicarbonate 25 21 - 32 mmol/L    Anion Gap 10 10 - 20 mmol/L    Urea Nitrogen 23 6 - 23 mg/dL    Creatinine 1.13 0.50 - 1.30 mg/dL    eGFR 64 >60 mL/min/1.73m*2    Calcium 8.3 (L) 8.6 - 10.3 mg/dL   POCT GLUCOSE   Result Value  Ref Range    POCT Glucose 108 (H) 74 - 99 mg/dL   POCT GLUCOSE   Result Value Ref Range    POCT Glucose 185 (H) 74 - 99 mg/dL     Vascular US Carotid Artery Duplex Bilateral  Result Date: 7/15/2025            [facility] [place] [id number] [facility] [place] [address]     Tel [phone] Fax [phone]  Vascular Lab Report  VASC US CAROTID ARTERY DUPLEX BILATERAL Patient Name:      TOYIN PARIKH         Reading Physician:  [id number] Beach MD Study Date:        7/15/2025            Ordering Provider:  [id number] MARSHAL VENTURA MRN/PID:           [id number]             Fellow: Accession#:        [id number]         Technologist:       Pamela Castaneda RVT, RDMS Date of Birth/Age: 1940 / 84 years Technologist 2: Gender:            M                    Encounter#:         [id number] Admission Status:  Inpatient            Location Performed: [facility]  Diagnosis/ICD: Cerebral infarction due to unspecified occlusion or stenosis of                right vertebral arteries-I63.211; Other cerebral infarction-I63.8 Indication:    CVA CPT Codes:     03980 Cerebrovascular Carotid Duplex scan complete  Pertinent History: HTN, Hyperlipidemia, TIA and CVA.  CONCLUSIONS: Right Carotid: Findings are consistent with 50 to 69% stenosis of the right proximal internal carotid artery. Laminar flow seen by color Doppler. Right external carotid artery appears patent with no evidence of stenosis. No evidence of hemodynamically significant stenosis of the right common carotid artery. The right vertebral artery demonstrates a high resistance waveform which may be suggestive of a more distal stenosis or occlusion. No evidence of hemodynamically significant stenosis in the right subclavian artery. Left Carotid: Findings are consistent with less  than 50% stenosis of the left proximal internal carotid artery. Laminar flow seen by color Doppler. Left external carotid artery appears patent with no evidence of stenosis. No evidence of hemodynamically significant stenosis of the left common carotid artery. The left vertebral artery is patent with antegrade flow. There are elevated velocities in the left subclavian artery that are suggestive of disease.  Imaging & Doppler Findings: Right Plaque Morph: The proximal right internal carotid artery demonstrates irregular and calcified plaque. The proximal right external carotid artery demonstrates heterogenous plaque. The mid right common carotid artery demonstrates intimal thickening plaque. The distal right common carotid artery demonstrates intimal thickening plaque. Left Plaque Morph: The proximal left internal carotid artery demonstrates irregular and calcified plaque. The mid left common carotid artery demonstrates smooth and heterogenous plaque. The distal left common carotid artery demonstrates smooth and heterogenous plaque.   Right                        Left   PSV      EDV                PSV      EDV 78 cm/s  13 cm/s   CCA P    75 cm/s  17 cm/s 63 cm/s  11 cm/s   CCA D    75 cm/s  17 cm/s 191 cm/s 46 cm/s   ICA P    103 cm/s 22 cm/s 149 cm/s 23 cm/s   ICA M    91 cm/s  20 cm/s 66 cm/s  20 cm/s   ICA D    78 cm/s  24 cm/s 99 cm/s  13 cm/s    ECA     114 cm/s 13 cm/s 44 cm/s  13 cm/s Vertebral  52 cm/s  17 cm/s 201 cm/s 0 cm/s  Subclavian 286 cm/s 0 cm/s                Right Left ICA/CCA Ratio  3.0  1.4   [id number] LUCIA Beach MD Electronically signed by [id number] Beach MD on 7/15/2025 at 5:12:10 PM  ** Final **     CT head wo IV contrast  Result Date: 7/15/2025  Interpreted By:  Nhi Swanson, STUDY: CT HEAD WO IV CONTRAST;  7/15/2025 4:03 pm   INDICATION: Signs/Symptoms:brain attack, NIH 1.   COMPARISON: 07/13/2025   ACCESSION NUMBER(S): [id number]   ORDERING CLINICIAN: NGUYỄN WILLS   TECHNIQUE:  Examination was performed in the axial plane using soft tissue and bone algorithm.   FINDINGS: INTRACRANIAL: There is prominence of the ventricular system and cerebral sulci consistent with cerebral atrophy. There are periventricular hypodensities consistent with  mild small vessel disease. No mass or mass effect is identified. There is no hemorrhage or subdural fluid collection. There is no acute infarct.     EXTRACRANIAL: Visualized paranasal sinuses and mastoids are clear.       No acute intracranial pathology.   MACRO: Nhi Swanson discussed the significance and urgency of this critical finding by EPIC secure chat with  NGUYỄN WILLS on 7/15/2025 at 4:10 pm.  (**-RCF-**) Findings:  See findings.   Signed by: Nhi Swanson 7/15/2025 4:10 PM Dictation workstation:   [id number]    Transthoracic Echo Complete  Result Date: 7/14/2025            [facility] [place] [address] [facility] [id number]    Tel [phone] Fax [phone] TRANSTHORACIC ECHOCARDIOGRAM REPORT Patient Name:       TOYIN PARIKH         Reading Physician:    [id number] Biju Bedolla MD Study Date:         7/14/2025            Ordering Provider:    [id number] DES LION MRN/PID:            [id number]             Fellow: Accession#:         [id number]         Nurse:                Bety AREVALO Date of Birth/Age:  1940 / 84 years Sonographer:          Stephanie Nguyen RD Gender Assigned at  M                    Additional Staff: Birth: Height:             180.34 cm            Admit Date:           7/13/2025 Weight:             80.29 kg             Admission Status:     Inpatient -                                                                Routine BSA / BMI:          2.00 m2 / 24.69      Department Location:  [facility] ICU Front                     kg/m2                                      (19-26) Blood  Pressure: 194 /84 mmHg Study Type:    TRANSTHORACIC ECHO (TTE) COMPLETE Diagnosis/ICD: Cerebral infarction due to unspecified occlusion or stenosis of                left vertebral arteries-I63.212 Indication:    Stroke CPT Codes:     Echo Complete w Full Doppler-[id number] Patient History: Pertinent History: TIA, Syncope, HTN, Hyperlipidemia and Dyslipidemia. GERD;                    previous echocardiogram 3-. Study Detail: The following Echo studies were performed: 2D, M-Mode, Doppler and               color flow. Agitated saline used as a contrast agent for               intraseptal flow evaluation.  PHYSICIAN INTERPRETATION: Left Ventricle: Left ventricular ejection fraction is normal calculated by Allison's biplane at 62%. There is eccentric left ventricular hypertrophy involving the septal wall. There are no regional wall motion abnormalities. The left ventricular cavity size is normal. There is moderately increased septal and normal posterior left ventricular wall thickness. There is left ventricular hypertrophy involving the septal wall. Spectral Doppler shows a Grade I (impaired relaxation pattern) of left ventricular diastolic filling with normal left atrial filling pressure. LV Wall Scoring: All segments are normal. Left Atrium: The left atrial size is mildly dilated. Right Ventricle: The right ventricle is normal in size. There is normal right ventricular global systolic function. Right Atrium: The right atrial size is normal. Aortic Valve: The aortic valve is trileaflet. There is mild aortic valve thickening. There is no evidence of aortic valve stenosis. There is no evidence of aortic valve regurgitation. Mitral Valve: The mitral valve is normal in structure. The doppler estimated peak and mean diastolic gradients are 7 mmHg and 2 mmHg, respectively. There is mild to moderate mitral annular calcification. There is trace mitral valve regurgitation. The E Vmax is 0.69 m/s. Tricuspid Valve: The  tricuspid valve is structurally normal. There is trace to mild tricuspid regurgitation. The Doppler estimated right ventricular systolic pressure (RVSP) is slightly elevated at 37 mmHg. Pulmonic Valve: The pulmonic valve is structurally normal. There is trace pulmonic valve regurgitation. Pericardium: No pericardial effusion noted. Aorta: The aortic root is normal. There is no dilatation of the ascending aorta. There is no dilatation of the aortic root. Systemic Veins: The hepatic vein appears to be of normal size. The inferior vena cava appears normal in size, with IVC inspiratory collapse greater than 50%.  CONCLUSIONS:  1. Left ventricular ejection fraction is normal calculated by Allison's biplane at 62%.  2. Spectral Doppler shows a Grade I (impaired relaxation pattern) of left ventricular diastolic filling with normal left atrial filling pressure.  3. There is normal right ventricular global systolic function.  4. The Doppler estimated RVSP is slightly elevated at 37 mmHg.  5. There is moderately increased septal thickness. QUANTITATIVE DATA SUMMARY:  2D MEASUREMENTS:             Normal Ranges: LAs:             4.03 cm     (2.7-4.0cm) IVSd:            1.63 cm     (0.6-1.1cm) LVPWd:           1.18 cm     (0.6-1.1cm) LVIDd:           4.47 cm     (3.9-5.9cm) LVIDs:           2.96 cm LV Mass Index:   123.5 g/m2 LVEDV Index:     41.80 ml/m2 LV % FS          33.8 %  LEFT ATRIUM:                  Normal Ranges: LA Vol A4C:        66.9 ml    (22+/-6mL/m2) LA Vol A2C:        63.7 ml LA Vol BP:         73.1 ml LA Vol Index A4C:  33.4 ml/m2 LA Vol Index A2C:  31.8 ml/m2 LA Vol Index BP:   36.5 ml/m2 LA Area A4C:       23.3 cm2 LA Area A2C:       20.3 cm2 LA Major Axis A4C: 6.9 cm LA Major Axis A2C: 5.5 cm LA Volume Index:   36.3 ml/m2 LA Vol A4C:        57.2 ml LA Vol A2C:        61.0 ml LA Vol Index BSA:  29.5 ml/m2  AORTA MEASUREMENTS:         Normal Ranges: Ao Sinus, d:        3.00 cm (2.1-3.5cm) Ao STJ, d:           2.90 cm (1.7-3.4cm) Asc Ao, d:          3.40 cm (2.1-3.4cm)  LV SYSTOLIC FUNCTION:                      Normal Ranges: EF-A4C View:    47 % (>=55%) EF-A2C View:    71 % EF-Biplane:     62 % LV EF Reported: 62 %  LV DIASTOLIC FUNCTION:             Normal Ranges: MV Peak E:             0.69 m/s    (0.7-1.2 m/s) MV Peak A:             1.26 m/s    (0.42-0.7 m/s) E/A Ratio:             0.54        (1.0-2.2) MV e'                  0.048 m/s   (>8.0) MV lateral e'          0.05 m/s MV medial e'           0.05 m/s E/e' Ratio:            14.21       (<8.0) PulmV Sys Srini:         68.24 cm/s PulmV Logan Srini:        39.13 cm/s PulmV S/D Srini:         1.74 PulmV A Revs Srini:      36.99 cm/s PulmV A Revs Dur:      117.03 msec  MITRAL VALVE:          Normal Ranges: MV Vmax:      1.31 m/s (<=1.3m/s) MV peak P.9 mmHg (<5mmHg) MV mean P.3 mmHg (<48mmHg) MV VTI:       35.74 cm (10-13cm) MV DT:        325 msec (150-240msec)  AORTIC VALVE:           Normal Ranges: AoV Vmax:      1.44 m/s (<=1.7m/s) AoV Peak P.3 mmHg (<20mmHg) LVOT Max Srini:  0.95 m/s (<=1.1m/s) LVOT VTI:      23.20 cm LVOT Diameter: 2.01 cm  (1.8-2.4cm) AoV Area,Vmax: 2.09 cm2 (2.5-4.5cm2)  RIGHT VENTRICLE: RV Basal 3.88 cm RV Mid   3.80 cm RV Major 7.6 cm TAPSE:   26.0 mm RV s'    0.13 m/s  TRICUSPID VALVE/RVSP:          Normal Ranges: Peak TR Velocity:     2.92 m/s Est. RA Pressure:     3 mmHg RV Syst Pressure:     37 mmHg  (< 30mmHg) IVC Diam:             1.89 cm  PULMONIC VALVE:          Normal Ranges: PV Accel Time:  80 msec  (>120ms) PV Max Srini:     0.9 m/s  (0.6-0.9m/s) PV Max PG:      3.3 mmHg  PULMONARY VEINS: PulmV A Revs Dur: 117.03 msec PulmV A Revs Srini: 36.99 cm/s PulmV Logan Srini:   39.13 cm/s PulmV S/D Srini:    1.74 PulmV Sys Srini:    68.24 cm/s  AORTA: Asc Ao Diam 3.42 cm  [id number] Biju Bedolla MD Electronically signed on 2025 at 12:57:57 PM  Wall Scoring  ** Final **     MR brain wo IV contrast  Result Date: 2025  Interpreted By:   Dario Cuellar, STUDY: MR BRAIN WO IV CONTRAST;  7/14/2025 12:19 pm   INDICATION: Signs/Symptoms:stroke like symptoms.     COMPARISON: CT head and CTA head and neck from 07/13/2025.   ACCESSION NUMBER(S): [id number]   ORDERING CLINICIAN: DES LION   TECHNIQUE: Axial T2, FLAIR, DWI, gradient echo T2 and sagittal and coronal T1 weighted images of brain were acquired.   FINDINGS: CSF Spaces: The ventricles, sulci and basal cisterns are within normal limits. There is no abnormal extra-axial fluid collection.   Parenchyma: There is no diffusion restriction abnormality to suggest acute infarct.  There are multiple small regions of T2 hyperintensity within cerebral hemispheric white matter and dilip which are probably a sequela of chronic small vessel ischemic changes. There is no mass effect or midline shift.   Paranasal Sinuses and Mastoids: Small mucosal retention cyst located within the right maxillary sinus, otherwise the visualized paranasal sinuses and mastoid air cells are essentially clear. Small amount of secretions may be present within the right nasal cavity.       No acute intracranial abnormality or mass effect. Findings of probable chronic small vessel ischemic changes.   This study was interpreted at [facility].   MACRO: None   Signed by: Dario Cuellar 7/14/2025 12:24 PM Dictation workstation:   [id number]    ECG 12 lead  Result Date: 7/14/2025  Normal sinus rhythm Possible Inferior infarct , age undetermined T wave abnormality, consider lateral ischemia Abnormal ECG When compared with ECG of 06-MAR-1997 09:49, Borderline criteria for Inferior infarct are now Present T wave inversion now evident in Lateral leads QT has lengthened Confirmed by Lei Cohen ([id number]) on 7/14/2025 11:31:03 AM    XR chest 1 view  Result Date: 7/13/2025  Interpreted By:  Jameson Esposito, STUDY: XR CHEST 1 VIEW;  7/13/2025 6:40 pm   INDICATION: Signs/Symptoms:Lightheadedness,  hypertension.     COMPARISON: None.   ACCESSION NUMBER(S): [id number]   ORDERING CLINICIAN: CATHY LARIOS   FINDINGS:         CARDIOMEDIASTINAL SILHOUETTE: Cardiomediastinal silhouette is normal in size and configuration.   LUNGS: There is mild pulmonary scar congestion. Bibasilar patchy airspace disease worse on the left   ABDOMEN: No remarkable upper abdominal findings.   BONES: No acute osseous changes.       1.  Bibasilar patchy airspace disease worse on the left with atelectasis and pneumonia in the differential.  Radiographic follow-up to resolution in 2-3 weeks is recommended.       MACRO: None   Signed by: Jameson Esposito 7/13/2025 6:55 PM Dictation workstation:   [id number]    CT brain attack angio head and neck W and WO IV contrast  Result Date: 7/13/2025  Interpreted By:  Jimmie Santana, STUDY: CT BRAIN ATTACK ANGIO HEAD AND NECK W AND WO IV CONTRAST;  7/13/2025 5:41 pm   INDICATION: Signs/Symptoms:RLE drift, slurred speech, ataxia, HTN.     COMPARISON: Noncontrast head CT 5:33 p.m..   ACCESSION NUMBER(S): [id number]   ORDERING CLINICIAN: JOE GUILLAUME   TECHNIQUE: 60 ML of Omnipaque 350 was administered intravenously and axial images of the head and neck were acquired.  Coronal, sagittal, and 3-D reconstructions were provided for review.   FINDINGS: Findings CT angiography neck: Origins of the great vessels are patent. The right common carotid artery is patent. There is subtle crescentic flattening of the medial aspect of the lumen of the left common carotid artery for instance on image 277 series 402 extending over a span of approximately 4.4 cm which could reflect asymmetric soft plaque versus dissection in this region. There significant atherosclerotic change in the region of the carotid bifurcations with likely mixed soft plaque and atherosclerotic calcification less than 50% stenosis by NASCET criteria although subjectively a higher degree of stenosis is noted particularly on the right side for  instance on image 372 series 402. The cervical internal carotid arteries are otherwise patent. Vertebral arteries are patent in the region of the neck. Limited examination through the lung apices is grossly unremarkable.   Findings CT angiography head: Distal internal carotid arteries are patent with 0.3 cm aneurysm or infundibulum of the right supraclinoid internal carotid artery noted for instance on image 623 of series 402. Proximal anterior and middle cerebral arteries are patent. There is high-grade stenosis more likely than focal occlusion of the V4 segment of the right vertebral artery on image 518 402 with distal filling and filling of the proximal right posteroinferior cerebellar artery. Less than 50% focal stenosis of the left V4 segment by NASCET criteria. The basilar artery is patent with moderate focal thickening in the midportion. The proximal posterior cerebral arteries are patent. Posterior communicating arteries are not clearly visualized.       High-grade/marked stenosis more likely than focal occlusion of the right V4 vertebral artery. Moderate focal stenosis of the mid to distal basilar artery. MRI would have greater sensitivity and specificity for recent ischemia if clinically suspected.   Flattening of the medial contour of the left common carotid artery over greater than 4 cm segment could be on the basis of soft atherosclerotic plaque or perhaps underlying dissection in this region.   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     MACRO:   Jimmie Santana discussed the significance and urgency of this critical finding by EPIC secure chat with  JOE GUILLAUME on 7/13/2025 at 6:05 pm.  (**-RCF-**) Findings:  See findings.   Signed by: Jimmie Santana 7/13/2025 6:05 PM Dictation workstation:   [id number]    CT  brain attack head wo IV contrast  Result Date: 7/13/2025  Interpreted By:  Gracia Zepeda, STUDY: CT BRAIN ATTACK HEAD WO IV CONTRAST;  7/13/2025 5:35 pm   INDICATION: Signs/Symptoms:Stroke Evaluation.     COMPARISON: None.   ACCESSION NUMBER(S): [id number]   ORDERING CLINICIAN: JOE GUILLAUME   TECHNIQUE: Unenhanced CT images of the head were obtained.   FINDINGS: The ventricles, cisterns and sulci are enlarged, consistent with diffuse volume loss. There are areas of nonspecific white matter hypodensity, which are probably age related or microvascular in nature.   There is no acute intracranial hemorrhage, mass effect or midline shift. No extraaxial fluid collection.   No acute displaced calvarial fracture.   Small nodular density in the anterior right maxillary sinus. Remaining visualized paranasal sinuses are clear.       No acute intracranial hemorrhage or mass-effect.   Small possible mucous retention cyst or polyp in the right maxillary sinus.   MACRO: Gracia Zepeda discussed the significance and urgency of this critical finding by EPIC secure chat with  JOE GUILLAUME on 7/13/2025 at 5:41 pm.  (**-RCF-**) Findings:  See findings.     Signed by: Gracia Zepeda 7/13/2025 5:43 PM Dictation workstation:   [id number]      Assessment & Plan  Cerebrovascular accident (CVA) due to stenosis of right vertebral artery (Multi)     Episode of Dysarthria - suspect TIA, Intracranial Antheroscleosis  - MRI Brain negative  - recommend continuing ASA 81 mg/day + Plavix 75 mg/day for 90 days  - control of vascular risk factors:   - long term goal BP < 130/80   - goal LDL < 70; continue statin  - Discontinue IV fluids; gradual lowering of blood pressure to normotensive  - Follow up with RODNEY Castro-CNP in 6 to 8 weeks.    2.  Left Common Carotid Artery Thrombus  - no anticoagulation required per vascular surgery  TIA (transient ischemic attack)    Mixed hyperlipidemia    Hypertension    Asymptomatic stenosis of right  "vertebral artery    Case/plan discussed and pt seen with DR. Hernandez.  No further needs from neurology; okay for transfer or discharge as per primary team. Please contact if condition changes for re-eval.        RODNEY Castro-CNP    I saw and evaluated the patient.  I obtained the key portions of the history and examination.  I reviewed the residents/APNs note, discussed the patient and supervised treatment plan formulation.    Subjective:  Slurred speech resolved    Objective:  BP (!) 194/96   Pulse 80   Temp 36.2 °C (97.2 °F)   Resp (!) 29   Ht 1.803 m (5' 11\")   Wt 78.5 kg (173 lb)   SpO2 96%   BMI 24.13 kg/m²     Gen: NAD  Neuro:  --HIF: A&O X 3, repetition and naming intact  --CN:  PERRLA, EOMI, VFF, no visible facial asymmetry, facial sensation intact, no tongue or palatal deviation, SCM intact  --Motor: Moves all 4 extremities equally; no focal deficits  --Sensory: Intact to light touch, intact to pinprick  --Reflex: 2+ symmetric, toes down  --Cerebellum: FTN and HTS intact  --Gait: Deferred     Assessment:    Dysarthria - likely TIA, Intracranial atherosclerosis  - recommend dAPT for 90 days followed by ASA 81 mg/day monotherapy  - aggressive control of vascular risk factors:   Gradual reduction of BP to a long-term goal < 130/80   Goal LDL< 70; continue statin    2.  Left CCA Thrombus - reviewed vascular surgery note  - no need for anticoagulation    Cirilo Hernandez MD  [facility]  Department of Neurology         [1] aspirin, 81 mg, oral, Daily  atorvastatin, 80 mg, oral, Nightly  clopidogrel, 75 mg, oral, Daily  insulin lispro, 0-5 Units, subcutaneous, q4h  valsartan, 320 mg, oral, Daily  [2]    [3] PRN medications: oxygen    "

## 2025-07-16 NOTE — CARE PLAN
The clinical goals for the shift include Patient will maintain SBP less than 220 throughout this shift. Goal met; patient has been hypertensive throughout the night, but systolic has been <220.  Kianna Leonard notified and no new orders received. Patient remained NSR on telemetry. Vital signs stable otherwise,  Safety maintained. Call light within reach. Alarms on.     Problem: Pain - Adult  Goal: Verbalizes/displays adequate comfort level or baseline comfort level  Outcome: Met     Problem: Safety - Adult  Goal: Free from fall injury  Outcome: Met     Problem: Discharge Planning  Goal: Discharge to home or other facility with appropriate resources  Outcome: Progressing     Problem: Chronic Conditions and Co-morbidities  Goal: Patient's chronic conditions and co-morbidity symptoms are monitored and maintained or improved  Outcome: Met     Problem: Nutrition  Goal: Nutrient intake appropriate for maintaining nutritional needs  Outcome: Progressing     Problem: Fall/Injury  Goal: Not fall by end of shift  Outcome: Met  Goal: Be free from injury by end of the shift  Outcome: Met

## 2025-07-16 NOTE — PROGRESS NOTES
Physical Therapy                 Therapy Communication Note    Patient Name: Radha Boone  MRN: [id number]  Department: [facility]  Room: 2109/2109-A  Today's Date: 7/16/2025     Discipline: Physical Therapy    Missed Visit: PT Missed Visit: Yes     Missed Visit Reason: Missed Visit Reason: Other (Comment)    Missed Time: Attempt    Comment: Pt hypertensive 209/119. Will continue to see per POC when medically appropriate.

## 2025-07-16 NOTE — PROGRESS NOTES
Internal Medicine Progress Note    Patient Name: Radha Boone          MRN: [id number]  Today's Date: July 16, 2025          Attending: Kingsley Fisher MD    Subjective:  Patient was seen and examined at bedside, patient is awake, cooperative, no distress, last night brain attack was called on patient repeat CT scan was done, neurology recommended to keep blood pressure elevated.    Review Of Systems:  GENERAL: No malaise or fevers.  CARDIOVASCULAR: Negative for chest pain, leg swelling  RESPIRATORY: Negative for shortness of breath  GI: No nausea, vomiting, or diarrhea  MUSCULOSKELETAL: Negative for joint pain or swelling      Objective:  Vitals:    07/16/25 0004 07/16/25 0409 07/16/25 0549 07/16/25 0732   BP: 169/84 (!) 199/106  (!) 209/119   BP Location: Left arm Left arm     Patient Position: Lying Lying     Pulse: 64 64  89   Resp: 14 11  20   Temp: 36.9 °C (98.4 °F) 36.9 °C (98.4 °F)  36.7 °C (98.1 °F)   TempSrc: Temporal Temporal     SpO2: 94% 95%  96%   Weight:   78.5 kg (173 lb)    Height:               Physical Exam:   General appearance: Well-appearing alert, in no acute distress   Skin: Skin color, texture, turgor normal  Head: normal  Eyes: Anicteric sclera. Pupils are equally round and reactive to light.  Extraocular movements are intact.   Ears: external ears normal  Nose/Sinuses: Negative  Oropharynx: Lips, mucosa, and tongue normal  Neck: Supple, no adenopathy; thyroid symmetric, normal size, no bruits  Lungs: Clear to auscultation, no wheezing or rhonchi  Heart: RRR without murmur, gallop, or rubs.  No ectopy  Abdomen: Soft, non-tender. Bowel sounds normal.  Extremities: No deformity, no edema  Neuro: Alert oriented x3, no focal deficit.             Labs:  Results for orders placed or performed during the hospital encounter of 07/13/25 (from the past 24 hours)   POCT GLUCOSE   Result Value Ref Range    POCT Glucose 160 (H) 74 - 99 mg/dL   POCT GLUCOSE   Result Value Ref Range    POCT Glucose 99 74 - 99  mg/dL   POCT GLUCOSE   Result Value Ref Range    POCT Glucose 93 74 - 99 mg/dL   POCT GLUCOSE   Result Value Ref Range    POCT Glucose 217 (H) 74 - 99 mg/dL   POCT GLUCOSE   Result Value Ref Range    POCT Glucose 95 74 - 99 mg/dL   CBC and Auto Differential   Result Value Ref Range    WBC 7.7 4.4 - 11.3 x10*3/uL    nRBC 0.0 0.0 - 0.0 /100 WBCs    RBC 4.69 4.50 - 5.90 x10*6/uL    Hemoglobin 13.7 13.5 - 17.5 g/dL    Hematocrit 42.8 41.0 - 52.0 %    MCV 91 80 - 100 fL    MCH 29.2 26.0 - 34.0 pg    MCHC 32.0 32.0 - 36.0 g/dL    RDW 13.0 11.5 - 14.5 %    Platelets 209 150 - 450 x10*3/uL    Neutrophils % 57.4 40.0 - 80.0 %    Immature Granulocytes %, Automated 0.5 0.0 - 0.9 %    Lymphocytes % 27.9 13.0 - 44.0 %    Monocytes % 9.9 2.0 - 10.0 %    Eosinophils % 3.5 0.0 - 6.0 %    Basophils % 0.8 0.0 - 2.0 %    Neutrophils Absolute 4.41 1.60 - 5.50 x10*3/uL    Immature Granulocytes Absolute, Automated 0.04 0.00 - 0.50 x10*3/uL    Lymphocytes Absolute 2.14 0.80 - 3.00 x10*3/uL    Monocytes Absolute 0.76 0.05 - 0.80 x10*3/uL    Eosinophils Absolute 0.27 0.00 - 0.40 x10*3/uL    Basophils Absolute 0.06 0.00 - 0.10 x10*3/uL   Basic Metabolic Panel   Result Value Ref Range    Glucose 107 (H) 74 - 99 mg/dL    Sodium 139 136 - 145 mmol/L    Potassium 3.5 3.5 - 5.3 mmol/L    Chloride 108 (H) 98 - 107 mmol/L    Bicarbonate 25 21 - 32 mmol/L    Anion Gap 10 10 - 20 mmol/L    Urea Nitrogen 23 6 - 23 mg/dL    Creatinine 1.13 0.50 - 1.30 mg/dL    eGFR 64 >60 mL/min/1.73m*2    Calcium 8.3 (L) 8.6 - 10.3 mg/dL   POCT GLUCOSE   Result Value Ref Range    POCT Glucose 108 (H) 74 - 99 mg/dL       Medications:  Scheduled medications  Scheduled Medications[1]  Continuous medications  Continuous Medications[2]  PRN medications  PRN Medications[3]      Assessment/Plan:  Assessment & Plan  TIA (transient ischemic attack)  Continue aspirin, Lipitor  PT/OT    Mixed hyperlipidemia  Continue atorvastatin  Hypertension  Patient had hypertensive urgency  "was restarted on valsartan, will monitor blood pressure  Asymptomatic stenosis of right vertebral artery  Continue aspirin and Lipitor  Vascular surgery did not recommend anticoagulation    Discussed with patient, IRINA Fisher MD   Date: 07/16/25  Time: 9:52 AM    This note was partially created using voice recognition software and is inherently subject to errors including those of syntax and \"sound-alike\" substitutions which may escape proofreading. In such instances, original meaning may be extrapolated by contextual derivation         [1] aspirin, 81 mg, oral, Daily  atorvastatin, 80 mg, oral, Nightly  clopidogrel, 75 mg, oral, Daily  insulin lispro, 0-5 Units, subcutaneous, q4h  valsartan, 320 mg, oral, Daily  [2] lactated Ringer's, 60 mL/hr, Last Rate: 60 mL/hr (07/15/25 4483)  [3] PRN medications: oxygen    "

## 2025-07-16 NOTE — CARE PLAN
The clinical goals for the shift include Patient will maintain SBP less than 220 throughout this shift. No s/s of a stroke      Goals are met. Pt. Neuro assessments are negative. No slurred speech. Long term goal for bp is around 130s per neuro.  SBP in 170s.

## 2025-07-17 PROBLEM — I65.29 CAROTID STENOSIS: Status: ACTIVE | Noted: 2025-07-17

## 2025-07-17 LAB
ANION GAP SERPL CALC-SCNC: 11 MMOL/L (ref 10–20)
BUN SERPL-MCNC: 22 MG/DL (ref 6–23)
CALCIUM SERPL-MCNC: 8.4 MG/DL (ref 8.6–10.3)
CHLORIDE SERPL-SCNC: 106 MMOL/L (ref 98–107)
CO2 SERPL-SCNC: 24 MMOL/L (ref 21–32)
CREAT SERPL-MCNC: 1.16 MG/DL (ref 0.5–1.3)
EGFRCR SERPLBLD CKD-EPI 2021: 62 ML/MIN/1.73M*2
GLUCOSE BLD MANUAL STRIP-MCNC: 118 MG/DL (ref 74–99)
GLUCOSE BLD MANUAL STRIP-MCNC: 146 MG/DL (ref 74–99)
GLUCOSE BLD MANUAL STRIP-MCNC: 149 MG/DL (ref 74–99)
GLUCOSE BLD MANUAL STRIP-MCNC: 81 MG/DL (ref 74–99)
GLUCOSE BLD MANUAL STRIP-MCNC: 98 MG/DL (ref 74–99)
GLUCOSE SERPL-MCNC: 129 MG/DL (ref 74–99)
POTASSIUM SERPL-SCNC: 3.4 MMOL/L (ref 3.5–5.3)
SODIUM SERPL-SCNC: 138 MMOL/L (ref 136–145)

## 2025-07-17 PROCEDURE — 2060000001 HC INTERMEDIATE ICU ROOM DAILY

## 2025-07-17 PROCEDURE — 2500000001 HC RX 250 WO HCPCS SELF ADMINISTERED DRUGS (ALT 637 FOR MEDICARE OP): Performed by: NURSE PRACTITIONER

## 2025-07-17 PROCEDURE — 36415 COLL VENOUS BLD VENIPUNCTURE: CPT | Performed by: NURSE PRACTITIONER

## 2025-07-17 PROCEDURE — 80048 BASIC METABOLIC PNL TOTAL CA: CPT | Performed by: NURSE PRACTITIONER

## 2025-07-17 PROCEDURE — 2500000002 HC RX 250 W HCPCS SELF ADMINISTERED DRUGS (ALT 637 FOR MEDICARE OP, ALT 636 FOR OP/ED)

## 2025-07-17 PROCEDURE — 2500000001 HC RX 250 WO HCPCS SELF ADMINISTERED DRUGS (ALT 637 FOR MEDICARE OP): Performed by: PSYCHIATRY & NEUROLOGY

## 2025-07-17 PROCEDURE — 99223 1ST HOSP IP/OBS HIGH 75: CPT | Performed by: INTERNAL MEDICINE

## 2025-07-17 PROCEDURE — 2500000004 HC RX 250 GENERAL PHARMACY W/ HCPCS (ALT 636 FOR OP/ED): Performed by: INTERNAL MEDICINE

## 2025-07-17 PROCEDURE — 2500000001 HC RX 250 WO HCPCS SELF ADMINISTERED DRUGS (ALT 637 FOR MEDICARE OP)

## 2025-07-17 PROCEDURE — 2500000001 HC RX 250 WO HCPCS SELF ADMINISTERED DRUGS (ALT 637 FOR MEDICARE OP): Performed by: PHYSICIAN ASSISTANT

## 2025-07-17 PROCEDURE — 82947 ASSAY GLUCOSE BLOOD QUANT: CPT

## 2025-07-17 RX ORDER — POTASSIUM CHLORIDE 20 MEQ/1
40 TABLET, EXTENDED RELEASE ORAL ONCE
Status: COMPLETED | OUTPATIENT
Start: 2025-07-17 | End: 2025-07-17

## 2025-07-17 RX ORDER — HYDRALAZINE HYDROCHLORIDE 25 MG/1
25 TABLET, FILM COATED ORAL ONCE
Status: COMPLETED | OUTPATIENT
Start: 2025-07-17 | End: 2025-07-17

## 2025-07-17 RX ORDER — INSULIN LISPRO 100 [IU]/ML
0-5 INJECTION, SOLUTION INTRAVENOUS; SUBCUTANEOUS
Status: DISCONTINUED | OUTPATIENT
Start: 2025-07-17 | End: 2025-07-18 | Stop reason: HOSPADM

## 2025-07-17 RX ORDER — ENOXAPARIN SODIUM 100 MG/ML
1 INJECTION SUBCUTANEOUS EVERY 12 HOURS
Status: DISCONTINUED | OUTPATIENT
Start: 2025-07-17 | End: 2025-07-18 | Stop reason: HOSPADM

## 2025-07-17 RX ADMIN — VALSARTAN 320 MG: 320 TABLET, FILM COATED ORAL at 09:45

## 2025-07-17 RX ADMIN — HYDRALAZINE HYDROCHLORIDE 25 MG: 25 TABLET ORAL at 09:45

## 2025-07-17 RX ADMIN — ENOXAPARIN SODIUM 80 MG: 80 INJECTION SUBCUTANEOUS at 09:46

## 2025-07-17 RX ADMIN — POTASSIUM CHLORIDE EXTENDED-RELEASE 40 MEQ: 1500 TABLET ORAL at 09:45

## 2025-07-17 RX ADMIN — ATORVASTATIN CALCIUM 80 MG: 80 TABLET, FILM COATED ORAL at 20:10

## 2025-07-17 RX ADMIN — ASPIRIN 81 MG CHEWABLE TABLET 81 MG: 81 TABLET CHEWABLE at 09:45

## 2025-07-17 RX ADMIN — HYDRALAZINE HYDROCHLORIDE 25 MG: 25 TABLET ORAL at 04:34

## 2025-07-17 RX ADMIN — ENOXAPARIN SODIUM 80 MG: 80 INJECTION SUBCUTANEOUS at 20:46

## 2025-07-17 RX ADMIN — HYDRALAZINE HYDROCHLORIDE 25 MG: 25 TABLET ORAL at 20:10

## 2025-07-17 RX ADMIN — CLOPIDOGREL 75 MG: 75 TABLET ORAL at 09:45

## 2025-07-17 ASSESSMENT — COGNITIVE AND FUNCTIONAL STATUS - GENERAL
MOBILITY SCORE: 23
CLIMB 3 TO 5 STEPS WITH RAILING: A LITTLE

## 2025-07-17 ASSESSMENT — PAIN SCALES - GENERAL
PAINLEVEL_OUTOF10: 0 - NO PAIN

## 2025-07-17 ASSESSMENT — PAIN - FUNCTIONAL ASSESSMENT
PAIN_FUNCTIONAL_ASSESSMENT: 0-10

## 2025-07-17 NOTE — ASSESSMENT & PLAN NOTE
Continue aspirin and Lipitor  Will start patient on Lovenox 1 mg/kg  Waiting for cardiology evaluation

## 2025-07-17 NOTE — PROGRESS NOTES
07/17/25 1510   Intensity of Service   Intensity of Service 0-30 min     Spoke with patient and his wife. Declined any HHC or any other home services. States his new PCP will be Dr. Fisher.

## 2025-07-17 NOTE — DOCUMENTATION CLARIFICATION NOTE
"    PATIENT:               TOYIN PARIKH  ACCT #:                  [id number]  MRN:                       [id number]  :                       1940  ADMIT DATE:       2025 5:24 PM  DISCH DATE:  RESPONDING PROVIDER #:        [id number]          PROVIDER RESPONSE TEXT:    TIA ruled in for this admission    CDI QUERY TEXT:    Clarification    Instruction:    Based on your assessment of the patient and the clinical information, please provide the requested documentation by clicking on the appropriate radio button and enter any additional information if prompted.    Question: Based on your medical judgment, can you please clarify which of these conditions is the most clinically supported    When answering this query, please exercise your independent professional judgment. The fact that a question is being asked, does not imply that any particular answer is desired or expected.    The patient's clinical indicators include:  Clinical Information: There is conflicting documentation in the medical record which requires clarification.    -The diagnosis of \"Cerebrovascular accident (CVA) due to stenosis of right vertebral artery\" and \"TIA (transient ischemic attack)\", \"MRI Brain negative\" was documented by Neurology on  0113 PM.    -The diagnosis of \"TIA (transient ischemic attack)\" was documented on  0952 AM.    Clinical Indicators:  CTA Brain Attack Head/Neck  \"High-grade/marked stenosis more likely than focal occlusion of the right V4 vertebral artery.\"    MRI Brain  \"No acute intracranial abnormality or mass effect. Findings of probable chronic small vessel ischemic changes.\"    Repeat CT Head 7/15 \"No acute intracranial pathology.\"    Treatment: CT Brain Attack Head.  CTA Brain Attack Head/Neck.  Repeat CT Head.  Bilateral Vascular Carotid Duplex.  Neurology, Vascular consults.  Aspirin 324 mg PO on .  Aspirin 81 mg PO daily -.  Lipitor 80 mg PO nightly -.  Plavix 75 mg PO daily " 7/15-7/17.    Risk Factors: Elderly male w/hx of TIA, HLD, HTN.  Options provided:  -- TIA ruled in for this admission  -- CVA due to stenosis of right vertebral artery ruled in for this admission  -- Other - I will add my own diagnosis  -- Refer to Clinical Documentation Reviewer    Query created by: Laurel Menendez on 7/17/2025 1:09 PM      Electronically signed by:  MERLIN MEJIA MD 7/17/2025 1:29 PM

## 2025-07-17 NOTE — PROGRESS NOTES
Internal Medicine Progress Note    Patient Name: Radha Boone          MRN: [id number]  Today's Date: July 17, 2025          Attending: Kingsley Fisher MD    Subjective:  Patient was seen and examined at bedside, patient is awake, cooperative, patient was on Coumadin prior to admission, INR 2.2, patient has not received anticoagulation since admission, vascular surgery evaluated the patient and deferred decision for anticoagulation to cardiology, will start patient on Lovenox 1 mg/kg waiting for cardiology evaluation.    Review Of Systems:  GENERAL: No malaise or fevers.  CARDIOVASCULAR: Negative for chest pain, leg swelling  RESPIRATORY: Negative for shortness of breath, cough, wheezing  GI: No nausea, vomiting, or diarrhea  MUSCULOSKELETAL: Negative for joint pain or swelling      Objective:  Vitals:    07/17/25 0025 07/17/25 0420 07/17/25 0600 07/17/25 0730   BP: 156/72 (!) 198/100 155/79 151/83   BP Location: Left arm Left arm  Left arm   Patient Position: Lying Lying  Sitting   Pulse: 74  78    Resp: 11 16 19    Temp: 36.9 °C (98.4 °F) 36.6 °C (97.9 °F)  36.9 °C (98.4 °F)   TempSrc: Temporal Temporal  Temporal   SpO2: 97% 98%  94%   Weight:   78.1 kg (172 lb 3.2 oz)    Height:               Physical Exam:   General appearance: Well-appearing alert, in no acute distress  Lungs: Clear to auscultation, no wheezing or rhonchi  Heart: RRR without murmur, gallop, or rubs.  No ectopy  Abdomen: Soft, non-tender. Bowel sounds normal.  Extremities: No deformity, no edema  Neuro: Alert oriented x3, no focal deficit.             Labs:  Results for orders placed or performed during the hospital encounter of 07/13/25 (from the past 24 hours)   POCT GLUCOSE   Result Value Ref Range    POCT Glucose 185 (H) 74 - 99 mg/dL   POCT GLUCOSE   Result Value Ref Range    POCT Glucose 209 (H) 74 - 99 mg/dL   POCT GLUCOSE   Result Value Ref Range    POCT Glucose 98 74 - 99 mg/dL   Basic Metabolic Panel   Result Value Ref Range    Glucose 129  "(H) 74 - 99 mg/dL    Sodium 138 136 - 145 mmol/L    Potassium 3.4 (L) 3.5 - 5.3 mmol/L    Chloride 106 98 - 107 mmol/L    Bicarbonate 24 21 - 32 mmol/L    Anion Gap 11 10 - 20 mmol/L    Urea Nitrogen 22 6 - 23 mg/dL    Creatinine 1.16 0.50 - 1.30 mg/dL    eGFR 62 >60 mL/min/1.73m*2    Calcium 8.4 (L) 8.6 - 10.3 mg/dL   POCT GLUCOSE   Result Value Ref Range    POCT Glucose 149 (H) 74 - 99 mg/dL       Medications:  Scheduled medications  Scheduled Medications[1]  Continuous medications  Continuous Medications[2]  PRN medications  PRN Medications[3]      Assessment/Plan:  Assessment & Plan  TIA (transient ischemic attack)  Continue aspirin, Plavix, Lipitor  PT/OT    Mixed hyperlipidemia  Continue atorvastatin  Hypertension  Blood pressure is still uncontrolled  Continue valsartan, hydralazine  Monitor blood pressure  Asymptomatic stenosis of right vertebral artery  Carotid stenosis  Continue aspirin and Lipitor  Will start patient on Lovenox 1 mg/kg  Waiting for cardiology evaluation    Discussed with patient, RN    Kingsley Fisher MD   Date: 07/17/25  Time: 9:36 AM    This note was partially created using voice recognition software and is inherently subject to errors including those of syntax and \"sound-alike\" substitutions which may escape proofreading. In such instances, original meaning may be extrapolated by contextual derivation         [1] aspirin, 81 mg, oral, Daily  atorvastatin, 80 mg, oral, Nightly  clopidogrel, 75 mg, oral, Daily  enoxaparin, 1 mg/kg, subcutaneous, q12h  hydrALAZINE, 25 mg, oral, BID  insulin lispro, 0-5 Units, subcutaneous, q4h  potassium chloride CR, 40 mEq, oral, Once  valsartan, 320 mg, oral, Daily  [2]    [3] PRN medications: oxygen    "

## 2025-07-17 NOTE — CONSULTS
Inpatient consult to Cardiology  Consult performed by: Kaden Lynne MD  Consult ordered by: RODNEY Vega-CNP  Reason for consult: Anticoagulation recommendation  Assessment/Recommendations:     Impressions:          History Of Present Illness:    Radha Boone is a 84 y.o. male presenting with stenosis of the right vertebral artery.    Primary cardiologist:  [facility]    I am seeing this 84-year-old hypertensive, prediabetic, hyperlipidemic man for evaluation of potential cardiac need for systemic anticoagulation.  The patient was admitted on July 13, 2025 with a variety of symptoms including dysarthria and lethargy.  The symptoms occurred while he was watching television.  He had pain in the back of his head.  His wife called 911 and he was brought to [facility] for further evaluation and care.  While here, he has been evaluated by Dr. Hernandez of the neurology service, and the vascular team.  He has stenosis of the right vertebral artery.  Please see the radiographic studies for complete details.  Neurology has recommended medical therapy with dual antiplatelet therapy and high-dose statin.  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  According to available records, he has been on warfarin dating back to 2018 for this indication.    The patient has no prior history of coronary artery disease, myocardial infarction, congestive heart failure, valvular heart disease, atrial fibrillation, or history of other arrhythmias.  According to the patient and his wife, he had normal imaging stress test in 2024 at [facility] the results of which are not currently available, but have been requested.  Echocardiogram done on July 14, 2025 revealed an ejection fraction of 62% with no significant valvular or pericardial disease.  The estimated RV systolic pressure was 37 mm.    The patient  denies symptoms of angina, dyspnea, palpitations, orthopnea, PND, syncope, and near syncope.      PMH: Status post CVA 2017     Last Recorded Vitals:  Vitals:    07/17/25 0025 07/17/25 0420 07/17/25 0600 07/17/25 0730   BP: 156/72 (!) 198/100 155/79 151/83   BP Location: Left arm Left arm  Left arm   Patient Position: Lying Lying  Sitting   Pulse: 74  78    Resp: 11 16 19    Temp: 36.9 °C (98.4 °F) 36.6 °C (97.9 °F)  36.9 °C (98.4 °F)   TempSrc: Temporal Temporal  Temporal   SpO2: 97% 98%  94%   Weight:   78.1 kg (172 lb 3.2 oz)    Height:           Last Labs:    Results from last 7 days   Lab Units 07/17/25  0527 07/15/25  0546 07/14/25  0431   SODIUM mmol/L 138   < > 141   POTASSIUM mmol/L 3.4*   < > 3.6   CHLORIDE mmol/L 106   < > 108*   CO2 mmol/L 24   < > 26   BUN mg/dL 22   < > 21   CREATININE mg/dL 1.16   < > 1.04   CALCIUM mg/dL 8.4*   < > 8.4*   PROTEIN TOTAL g/dL  --   --  6.2*   BILIRUBIN TOTAL mg/dL  --   --  0.6   ALK PHOS U/L  --   --  46   ALT U/L  --   --  11   AST U/L  --   --  13   GLUCOSE mg/dL 129*   < > 107*    < > = values in this interval not displayed.        Results for orders placed or performed during the hospital encounter of 07/13/25 (from the past 24 hours)   POCT GLUCOSE   Result Value Ref Range    POCT Glucose 185 (H) 74 - 99 mg/dL   POCT GLUCOSE   Result Value Ref Range    POCT Glucose 209 (H) 74 - 99 mg/dL   POCT GLUCOSE   Result Value Ref Range    POCT Glucose 98 74 - 99 mg/dL   Basic Metabolic Panel   Result Value Ref Range    Glucose 129 (H) 74 - 99 mg/dL    Sodium 138 136 - 145 mmol/L    Potassium 3.4 (L) 3.5 - 5.3 mmol/L    Chloride 106 98 - 107 mmol/L    Bicarbonate 24 21 - 32 mmol/L    Anion Gap 11 10 - 20 mmol/L    Urea Nitrogen 22 6 - 23 mg/dL    Creatinine 1.16 0.50 - 1.30 mg/dL    eGFR 62 >60 mL/min/1.73m*2    Calcium 8.4 (L) 8.6 - 10.3 mg/dL   POCT GLUCOSE   Result Value Ref Range    POCT Glucose 149 (H) 74 - 99 mg/dL         PTT - 7/13/2025:  5:33 PM  2.2   24.0 36      Troponin I, High Sensitivity   Date/Time Value Ref Range Status   07/13/2025 05:33 PM 17 0 - 20 ng/L Final     BNP   Date/Time Value Ref Range Status   07/13/2025 05:33 PM 75 0 - 99 pg/mL Final     Hemoglobin A1C   Date/Time Value Ref Range Status   07/13/2025 05:33 PM 6.3 (H) See comment % Final   03/15/2019 10:54 AM 6.1 % Final     Comment:          Diagnosis of Diabetes-Adults   Non-Diabetic: < or = 5.6%   Increased risk for developing diabetes: 5.7-6.4%   Diagnostic of diabetes: > or = 6.5%  .       Monitoring of Diabetes                Age (y)     Therapeutic Goal (%)   Adults:          >18           <7.0   Pediatrics:    13-18           <7.5                   7-12           <8.0                   0- 6            7.5-8.5   American Diabetes Association. Diabetes Care 33(S1), Jan 2010.     09/14/2018 10:30 AM 6.1 % Final     Comment:          Diagnosis of Diabetes-Adults   Non-Diabetic: < or = 5.6%   Increased risk for developing diabetes: 5.7-6.4%   Diagnostic of diabetes: > or = 6.5%  .       Monitoring of Diabetes                Age (y)     Therapeutic Goal (%)   Adults:          >18           <7.0   Pediatrics:    13-18           <7.5                   7-12           <8.0                   0- 6            7.5-8.5   American Diabetes Association. Diabetes Care 33(S1), Jan 2010.       LDL Calculated   Date/Time Value Ref Range Status   07/13/2025 05:33 PM 88 <=99 mg/dL Final     Comment:                                 Near   Borderline      AGE      Desirable  Optimal    High     High     Very High     0-19 Y     0 - 109     ---    110-129   >/= 130     ----    20-24 Y     0 - 119     ---    120-159   >/= 160     ----      >24 Y     0 -  99   100-129  130-159   160-189     >/=190    LDL Cholesterol is calculated using the Friedewald equation.     VLDL   Date/Time Value Ref Range Status   07/13/2025 05:33 PM 21 0 - 40 mg/dL Final   09/14/2018 10:30 AM 20 0 - 40 mg/dL Final   04/06/2018 12:42 PM 14 0 - 40  mg/dL Final      Last I/O:  I/O last 3 completed shifts:  In: 840 (10.8 mL/kg) [P.O.:840]  Out: 1000 (12.8 mL/kg) [Urine:1000 (0.4 mL/kg/hr)]  Weight: 78.1 kg     Past Medical History:  He has a past medical history of Abdominal distension (gaseous) (10/28/2015), Cerebral infarction due to unspecified occlusion or stenosis of left carotid arteries (Multi) (05/21/2019), Eructation (04/24/2015), Flatulence (04/24/2015), Hypertension, Orthostatic hypotension (03/15/2019), Other abnormal glucose (10/21/2014), Personal history of other diseases of the nervous system and sense organs (10/21/2014), Personal history of other endocrine, nutritional and metabolic disease, Personal history of transient ischemic attack (TIA), and cerebral infarction without residual deficits (03/15/2019), Stroke (Multi), and Transient global amnesia (12/04/2013).    Past Surgical History:  He has a past surgical history that includes Other surgical history (07/16/2019); Colonoscopy (07/16/2019); Colonoscopy (07/16/2019); Cataract extraction (07/27/2016); and Hernia repair (03/27/2014).      Social History:  He reports that he has never smoked. He has never used smokeless tobacco. He reports that he does not currently use alcohol. He reports that he does not use drugs.    Family History:  Family History[1]     Review of Systems:  Patient denies fevers, chills, nausea, vomiting, diarrhea, constipation, hematuria, dysuria, abdominal pain,  red blood per rectum, cough; all other review of systems is negative.    Allergies:  Amlodipine and Epinephrine-chlorpheniramine    Inpatient Medications:  Scheduled Medications[2]  PRN Medications[3]  Continuous Medications[4]  Outpatient Medications:  Current Outpatient Medications   Medication Instructions    aspirin 81 mg, oral, Nightly    hydrALAZINE (APRESOLINE) 25 mg, 2 times daily    pravastatin (PRAVACHOL) 20 mg, Nightly    valsartan (DIOVAN) 320 mg, Nightly    vit C/E/Zn/coppr/lutein/zeaxan  (PRESERVISION AREDS-2 ORAL) 1 capsule, 2 times daily    warfarin (COUMADIN) 5 mg, oral, Every Mon/Wed/Fri, evening    warfarin (COUMADIN) 2.5 mg, Every Sun/Tues/Thur/Sat       Current Imaging  Imaging  CT head wo IV contrast  Result Date: 7/15/2025  No acute intracranial pathology.   MACRO: Nhi Swanson discussed the significance and urgency of this critical finding by EPIC secure chat with  NGUYỄN WILLS on 7/15/2025 at 4:10 pm.  (**-RCF-**) Findings:  See findings.   Signed by: Nhi Swanson 7/15/2025 4:10 PM Dictation workstation:   [id number]    MR brain wo IV contrast  Result Date: 7/14/2025  No acute intracranial abnormality or mass effect. Findings of probable chronic small vessel ischemic changes.   This study was interpreted at [facility].   MACRO: None   Signed by: Dario Cuellar 7/14/2025 12:24 PM Dictation workstation:   [id number]    XR chest 1 view  Result Date: 7/13/2025  1.  Bibasilar patchy airspace disease worse on the left with atelectasis and pneumonia in the differential.  Radiographic follow-up to resolution in 2-3 weeks is recommended.       MACRO: None   Signed by: Jameson Esposito 7/13/2025 6:55 PM Dictation workstation:   [id number]    CT brain attack angio head and neck W and WO IV contrast  Result Date: 7/13/2025  High-grade/marked stenosis more likely than focal occlusion of the right V4 vertebral artery. Moderate focal stenosis of the mid to distal basilar artery. MRI would have greater sensitivity and specificity for recent ischemia if clinically suspected.   Flattening of the medial contour of the left common carotid artery over greater than 4 cm segment could be on the basis of soft atherosclerotic plaque or perhaps underlying dissection in this region.   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     MACRO:   Jimmie Santana discussed the significance and urgency of this critical finding by EPIC secure chat with  JOE GUILLAUME on 7/13/2025 at 6:05 pm.  (**-RCF-**) Findings:  See findings.   Signed by: Jimmie Santana 7/13/2025 6:05 PM Dictation workstation:   [id number]    CT brain attack head wo IV contrast  Result Date: 7/13/2025  No acute intracranial hemorrhage or mass-effect.   Small possible mucous retention cyst or polyp in the right maxillary sinus.   MACRO: Gracia Zepeda discussed the significance and urgency of this critical finding by EPIC secure chat with  JOE GUILLAUME on 7/13/2025 at 5:41 pm.  (**-RCF-**) Findings:  See findings.     Signed by: Gracia Zepeda 7/13/2025 5:43 PM Dictation workstation:   [id number]      Cardiology, Vascular, and Other Imaging  Vascular US Carotid Artery Duplex Bilateral  Result Date: 7/15/2025            [facility] [address] [address] [id number]     Tel [phone] Fax [phone]  Vascular Lab Report  VASC US CAROTID ARTERY DUPLEX BILATERAL Patient Name:      TOYIN Shah Physician:  [id number] Beach MD Study Date:        7/15/2025            Ordering Provider:  [id number] MARSHAL VENTURA MRN/PID:           [id number]             Fellow: Accession#:        [id number]         Technologist:       Pamela Castaneda RVT, RDMS Date of Birth/Age: 1940 / 84 years Technologist 2: Gender:            M                    Encounter#:         [id number] Admission Status:  Inpatient            Location Performed: [facility]  Diagnosis/ICD: Cerebral infarction due to unspecified occlusion or stenosis of                right vertebral arteries-I63.211; Other cerebral infarction-I63.8 Indication:    CVA CPT  Codes:     81150 Cerebrovascular Carotid Duplex scan complete  Pertinent History: HTN, Hyperlipidemia, TIA and CVA.  CONCLUSIONS: Right Carotid: Findings are consistent with 50 to 69% stenosis of the right proximal internal carotid artery. Laminar flow seen by color Doppler. Right external carotid artery appears patent with no evidence of stenosis. No evidence of hemodynamically significant stenosis of the right common carotid artery. The right vertebral artery demonstrates a high resistance waveform which may be suggestive of a more distal stenosis or occlusion. No evidence of hemodynamically significant stenosis in the right subclavian artery. Left Carotid: Findings are consistent with less than 50% stenosis of the left proximal internal carotid artery. Laminar flow seen by color Doppler. Left external carotid artery appears patent with no evidence of stenosis. No evidence of hemodynamically significant stenosis of the left common carotid artery. The left vertebral artery is patent with antegrade flow. There are elevated velocities in the left subclavian artery that are suggestive of disease.  Imaging & Doppler Findings: Right Plaque Morph: The proximal right internal carotid artery demonstrates irregular and calcified plaque. The proximal right external carotid artery demonstrates heterogenous plaque. The mid right common carotid artery demonstrates intimal thickening plaque. The distal right common carotid artery demonstrates intimal thickening plaque. Left Plaque Morph: The proximal left internal carotid artery demonstrates irregular and calcified plaque. The mid left common carotid artery demonstrates smooth and heterogenous plaque. The distal left common carotid artery demonstrates smooth and heterogenous plaque.   Right                        Left   PSV      EDV                PSV      EDV 78 cm/s  13 cm/s   CCA P    75 cm/s  17 cm/s 63 cm/s  11 cm/s   CCA D    75 cm/s  17 cm/s 191 cm/s 46 cm/s   ICA P     103 cm/s 22 cm/s 149 cm/s 23 cm/s   ICA M    91 cm/s  20 cm/s 66 cm/s  20 cm/s   ICA D    78 cm/s  24 cm/s 99 cm/s  13 cm/s    ECA     114 cm/s 13 cm/s 44 cm/s  13 cm/s Vertebral  52 cm/s  17 cm/s 201 cm/s 0 cm/s  Subclavian 286 cm/s 0 cm/s                Right Left ICA/CCA Ratio  3.0  1.4   [id number] LUCIA Beach MD Electronically signed by [id number] Beach MD on 7/15/2025 at 5:12:10 PM  ** Final **     Transthoracic Echo Complete  Result Date: 7/14/2025            [facility] [address] [facility] [address]    Tel [phone] Fax [phone] TRANSTHORACIC ECHOCARDIOGRAM REPORT Patient Name:       TOYIN SALDANA JL         Reading Physician:    [id number] Biju Bedolla MD Study Date:         7/14/2025            Ordering Provider:    [id number] DES LION MRN/PID:            [id number]             Fellow: Accession#:         [id number]         Nurse:                Bety AREVALO Date of Birth/Age:  1940 / 84 years Sonographer:          Stephanie Nguyen RD Gender Assigned at  M                    Additional Staff: Birth: Height:             180.34 cm            Admit Date:           7/13/2025 Weight:             80.29 kg             Admission Status:     Inpatient -                                                                Routine BSA / BMI:          2.00 m2 / 24.69      Department Location:  [facility] ICU Front                     kg/m2                                      (19-26) Blood Pressure: 194 /84 mmHg Study Type:    TRANSTHORACIC ECHO (TTE) COMPLETE Diagnosis/ICD: Cerebral infarction due to unspecified occlusion or stenosis of                left vertebral arteries-I63.212 Indication:    Stroke CPT Codes:     Echo Complete w Full Doppler-[id number] Patient History: Pertinent History: TIA, Syncope, HTN, Hyperlipidemia and Dyslipidemia. GERD;                    previous  echocardiogram 3-. Study Detail: The following Echo studies were performed: 2D, M-Mode, Doppler and               color flow. Agitated saline used as a contrast agent for               intraseptal flow evaluation.  PHYSICIAN INTERPRETATION: Left Ventricle: Left ventricular ejection fraction is normal calculated by Allison's biplane at 62%. There is eccentric left ventricular hypertrophy involving the septal wall. There are no regional wall motion abnormalities. The left ventricular cavity size is normal. There is moderately increased septal and normal posterior left ventricular wall thickness. There is left ventricular hypertrophy involving the septal wall. Spectral Doppler shows a Grade I (impaired relaxation pattern) of left ventricular diastolic filling with normal left atrial filling pressure. LV Wall Scoring: All segments are normal. Left Atrium: The left atrial size is mildly dilated. Right Ventricle: The right ventricle is normal in size. There is normal right ventricular global systolic function. Right Atrium: The right atrial size is normal. Aortic Valve: The aortic valve is trileaflet. There is mild aortic valve thickening. There is no evidence of aortic valve stenosis. There is no evidence of aortic valve regurgitation. Mitral Valve: The mitral valve is normal in structure. The doppler estimated peak and mean diastolic gradients are 7 mmHg and 2 mmHg, respectively. There is mild to moderate mitral annular calcification. There is trace mitral valve regurgitation. The E Vmax is 0.69 m/s. Tricuspid Valve: The tricuspid valve is structurally normal. There is trace to mild tricuspid regurgitation. The Doppler estimated right ventricular systolic pressure (RVSP) is slightly elevated at 37 mmHg. Pulmonic Valve: The pulmonic valve is structurally normal. There is trace pulmonic valve regurgitation. Pericardium: No pericardial effusion noted. Aorta: The aortic root is normal. There is no dilatation of the  ascending aorta. There is no dilatation of the aortic root. Systemic Veins: The hepatic vein appears to be of normal size. The inferior vena cava appears normal in size, with IVC inspiratory collapse greater than 50%.  CONCLUSIONS:  1. Left ventricular ejection fraction is normal calculated by Allison's biplane at 62%.  2. Spectral Doppler shows a Grade I (impaired relaxation pattern) of left ventricular diastolic filling with normal left atrial filling pressure.  3. There is normal right ventricular global systolic function.  4. The Doppler estimated RVSP is slightly elevated at 37 mmHg.  5. There is moderately increased septal thickness. QUANTITATIVE DATA SUMMARY:  2D MEASUREMENTS:             Normal Ranges: LAs:             4.03 cm     (2.7-4.0cm) IVSd:            1.63 cm     (0.6-1.1cm) LVPWd:           1.18 cm     (0.6-1.1cm) LVIDd:           4.47 cm     (3.9-5.9cm) LVIDs:           2.96 cm LV Mass Index:   123.5 g/m2 LVEDV Index:     41.80 ml/m2 LV % FS          33.8 %  LEFT ATRIUM:                  Normal Ranges: LA Vol A4C:        66.9 ml    (22+/-6mL/m2) LA Vol A2C:        63.7 ml LA Vol BP:         73.1 ml LA Vol Index A4C:  33.4 ml/m2 LA Vol Index A2C:  31.8 ml/m2 LA Vol Index BP:   36.5 ml/m2 LA Area A4C:       23.3 cm2 LA Area A2C:       20.3 cm2 LA Major Axis A4C: 6.9 cm LA Major Axis A2C: 5.5 cm LA Volume Index:   36.3 ml/m2 LA Vol A4C:        57.2 ml LA Vol A2C:        61.0 ml LA Vol Index BSA:  29.5 ml/m2  AORTA MEASUREMENTS:         Normal Ranges: Ao Sinus, d:        3.00 cm (2.1-3.5cm) Ao STJ, d:          2.90 cm (1.7-3.4cm) Asc Ao, d:          3.40 cm (2.1-3.4cm)  LV SYSTOLIC FUNCTION:                      Normal Ranges: EF-A4C View:    47 % (>=55%) EF-A2C View:    71 % EF-Biplane:     62 % LV EF Reported: 62 %  LV DIASTOLIC FUNCTION:             Normal Ranges: MV Peak E:             0.69 m/s    (0.7-1.2 m/s) MV Peak A:             1.26 m/s    (0.42-0.7 m/s) E/A Ratio:             0.54         (1.0-2.2) MV e'                  0.048 m/s   (>8.0) MV lateral e'          0.05 m/s MV medial e'           0.05 m/s E/e' Ratio:            14.21       (<8.0) PulmV Sys Srini:         68.24 cm/s PulmV Logan Srini:        39.13 cm/s PulmV S/D Srini:         1.74 PulmV A Revs Srini:      36.99 cm/s PulmV A Revs Dur:      117.03 msec  MITRAL VALVE:          Normal Ranges: MV Vmax:      1.31 m/s (<=1.3m/s) MV peak P.9 mmHg (<5mmHg) MV mean P.3 mmHg (<48mmHg) MV VTI:       35.74 cm (10-13cm) MV DT:        325 msec (150-240msec)  AORTIC VALVE:           Normal Ranges: AoV Vmax:      1.44 m/s (<=1.7m/s) AoV Peak P.3 mmHg (<20mmHg) LVOT Max Srini:  0.95 m/s (<=1.1m/s) LVOT VTI:      23.20 cm LVOT Diameter: 2.01 cm  (1.8-2.4cm) AoV Area,Vmax: 2.09 cm2 (2.5-4.5cm2)  RIGHT VENTRICLE: RV Basal 3.88 cm RV Mid   3.80 cm RV Major 7.6 cm TAPSE:   26.0 mm RV s'    0.13 m/s  TRICUSPID VALVE/RVSP:          Normal Ranges: Peak TR Velocity:     2.92 m/s Est. RA Pressure:     3 mmHg RV Syst Pressure:     37 mmHg  (< 30mmHg) IVC Diam:             1.89 cm  PULMONIC VALVE:          Normal Ranges: PV Accel Time:  80 msec  (>120ms) PV Max Srini:     0.9 m/s  (0.6-0.9m/s) PV Max PG:      3.3 mmHg  PULMONARY VEINS: PulmV A Revs Dur: 117.03 msec PulmV A Revs Srini: 36.99 cm/s PulmV Logan Srini:   39.13 cm/s PulmV S/D Srini:    1.74 PulmV Sys Srini:    68.24 cm/s  AORTA: Asc Ao Diam 3.42 cm  [id number] Biju Bedolla MD Electronically signed on 2025 at 12:57:57 PM  Wall Scoring  ** Final **     ECG 12 lead  Result Date: 2025  Normal sinus rhythm Possible Inferior infarct , age undetermined T wave abnormality, consider lateral ischemia Abnormal ECG When compared with ECG of 06-MAR-1997 09:49, Borderline criteria for Inferior infarct are now Present T wave inversion now evident in Lateral leads QT has lengthened Confirmed by Lei Cohen ([id number]) on 2025 11:31:03 AM    I have reviewed the patient's EKGs.    Physical Exam:  GENERAL:  pleasant 84  year-old  HEENT: No xanthelasma  NECK: Supple, no palpable adenopathy or thyromegaly  CHEST: Clear to auscultation, respiratory effort unlabored  CARDIAC: RRR, normal S1 and S2, no audible murmur, rub, gallop, carotids are brisk, PMI is not displaced  ABD: Active bowel sounds, nontender, no organomegaly, no evidence of ascites  EXT: No clubbing, cyanosis, edema, or tenderness  NEURO: Awake, alert, appropriate, speech is fluent       Assessment/Plan   1.  Status post CVA  2.  Stenosis of right vertebral artery  3.  History of chronic left common carotid artery thrombosis  4.  Essential hypertension  5.  De facto CAD  6.  Hyperlipidemia  7.  Prediabetes    Recommendations:  1.  There is no documented indication for systemic anticoagulation from a cardiac perspective.  2.  I have reviewed his telemetry and he has no documented atrial fibrillation.  3.  An outpatient event monitor has been ordered and he can follow-up with us after testing is completed.  4.  We have requested records from his cardiologist at [facility] be faxed to my office.  I will review when available.    Code Status:  Full Code      Kaden Lynne MD         [1] No family history on file.  [2]   Scheduled medications   Medication Dose Route Frequency    aspirin  81 mg oral Daily    atorvastatin  80 mg oral Nightly    clopidogrel  75 mg oral Daily    enoxaparin  1 mg/kg subcutaneous q12h    hydrALAZINE  25 mg oral BID    insulin lispro  0-5 Units subcutaneous q4h    valsartan  320 mg oral Daily   [3]   PRN medications   Medication    oxygen   [4]   Continuous Medications   Medication Dose Last Rate

## 2025-07-17 NOTE — CARE PLAN
Problem: Discharge Planning  Goal: Discharge to home or other facility with appropriate resources  Outcome: Progressing     Problem: General Stroke  Goal: Establish a mutual long term goal with patient by discharge  Outcome: Progressing   The patient's goals for the shift include      The clinical goals for the shift include Pt will be free of neuro deficits this shift.

## 2025-07-18 ENCOUNTER — PHARMACY VISIT (OUTPATIENT)
Dept: PHARMACY | Facility: CLINIC | Age: 85
End: 2025-07-18
Payer: COMMERCIAL

## 2025-07-18 ENCOUNTER — APPOINTMENT (OUTPATIENT)
Dept: RADIOLOGY | Facility: HOSPITAL | Age: 85
DRG: 069 | End: 2025-07-18
Payer: MEDICARE

## 2025-07-18 VITALS
BODY MASS INDEX: 24.17 KG/M2 | HEIGHT: 71 IN | SYSTOLIC BLOOD PRESSURE: 172 MMHG | TEMPERATURE: 98.1 F | HEART RATE: 66 BPM | OXYGEN SATURATION: 96 % | RESPIRATION RATE: 16 BRPM | WEIGHT: 172.62 LBS | DIASTOLIC BLOOD PRESSURE: 103 MMHG

## 2025-07-18 PROBLEM — R33.9 URINARY RETENTION: Status: ACTIVE | Noted: 2025-07-18

## 2025-07-18 LAB
ANION GAP SERPL CALC-SCNC: 10 MMOL/L (ref 10–20)
BUN SERPL-MCNC: 23 MG/DL (ref 6–23)
CALCIUM SERPL-MCNC: 8.7 MG/DL (ref 8.6–10.3)
CHLORIDE SERPL-SCNC: 108 MMOL/L (ref 98–107)
CO2 SERPL-SCNC: 25 MMOL/L (ref 21–32)
CREAT SERPL-MCNC: 1.21 MG/DL (ref 0.5–1.3)
EGFRCR SERPLBLD CKD-EPI 2021: 59 ML/MIN/1.73M*2
GLUCOSE BLD MANUAL STRIP-MCNC: 112 MG/DL (ref 74–99)
GLUCOSE BLD MANUAL STRIP-MCNC: 119 MG/DL (ref 74–99)
GLUCOSE BLD MANUAL STRIP-MCNC: 89 MG/DL (ref 74–99)
GLUCOSE SERPL-MCNC: 111 MG/DL (ref 74–99)
POTASSIUM SERPL-SCNC: 4.1 MMOL/L (ref 3.5–5.3)
SODIUM SERPL-SCNC: 139 MMOL/L (ref 136–145)

## 2025-07-18 PROCEDURE — 2500000001 HC RX 250 WO HCPCS SELF ADMINISTERED DRUGS (ALT 637 FOR MEDICARE OP): Performed by: NURSE PRACTITIONER

## 2025-07-18 PROCEDURE — 80048 BASIC METABOLIC PNL TOTAL CA: CPT | Performed by: NURSE PRACTITIONER

## 2025-07-18 PROCEDURE — 2500000001 HC RX 250 WO HCPCS SELF ADMINISTERED DRUGS (ALT 637 FOR MEDICARE OP): Performed by: PHYSICIAN ASSISTANT

## 2025-07-18 PROCEDURE — 82947 ASSAY GLUCOSE BLOOD QUANT: CPT

## 2025-07-18 PROCEDURE — 2500000004 HC RX 250 GENERAL PHARMACY W/ HCPCS (ALT 636 FOR OP/ED): Performed by: INTERNAL MEDICINE

## 2025-07-18 PROCEDURE — 36415 COLL VENOUS BLD VENIPUNCTURE: CPT | Performed by: NURSE PRACTITIONER

## 2025-07-18 PROCEDURE — 2500000001 HC RX 250 WO HCPCS SELF ADMINISTERED DRUGS (ALT 637 FOR MEDICARE OP): Performed by: PSYCHIATRY & NEUROLOGY

## 2025-07-18 PROCEDURE — RXMED WILLOW AMBULATORY MEDICATION CHARGE

## 2025-07-18 PROCEDURE — 97116 GAIT TRAINING THERAPY: CPT | Mod: GP,CQ

## 2025-07-18 PROCEDURE — 97530 THERAPEUTIC ACTIVITIES: CPT | Mod: GP,CQ

## 2025-07-18 RX ORDER — ATORVASTATIN CALCIUM 80 MG/1
80 TABLET, FILM COATED ORAL NIGHTLY
Qty: 30 TABLET | Refills: 0 | Status: SHIPPED | OUTPATIENT
Start: 2025-07-18 | End: 2025-08-17

## 2025-07-18 RX ORDER — METOPROLOL TARTRATE 25 MG/1
25 TABLET, FILM COATED ORAL 2 TIMES DAILY
Status: DISCONTINUED | OUTPATIENT
Start: 2025-07-18 | End: 2025-07-18 | Stop reason: HOSPADM

## 2025-07-18 RX ORDER — METOPROLOL TARTRATE 25 MG/1
25 TABLET, FILM COATED ORAL 2 TIMES DAILY
Qty: 60 TABLET | Refills: 0 | Status: SHIPPED | OUTPATIENT
Start: 2025-07-18

## 2025-07-18 RX ORDER — CLOPIDOGREL BISULFATE 75 MG/1
75 TABLET ORAL DAILY
Qty: 30 TABLET | Refills: 2 | Status: SHIPPED | OUTPATIENT
Start: 2025-07-18 | End: 2025-08-17

## 2025-07-18 RX ADMIN — ASPIRIN 81 MG CHEWABLE TABLET 81 MG: 81 TABLET CHEWABLE at 09:16

## 2025-07-18 RX ADMIN — HYDRALAZINE HYDROCHLORIDE 25 MG: 25 TABLET ORAL at 09:17

## 2025-07-18 RX ADMIN — VALSARTAN 320 MG: 320 TABLET, FILM COATED ORAL at 09:17

## 2025-07-18 RX ADMIN — METOPROLOL TARTRATE 25 MG: 25 TABLET, FILM COATED ORAL at 13:32

## 2025-07-18 RX ADMIN — CLOPIDOGREL 75 MG: 75 TABLET ORAL at 09:17

## 2025-07-18 RX ADMIN — ENOXAPARIN SODIUM 80 MG: 80 INJECTION SUBCUTANEOUS at 09:16

## 2025-07-18 ASSESSMENT — COGNITIVE AND FUNCTIONAL STATUS - GENERAL
HELP NEEDED FOR BATHING: A LITTLE
CLIMB 3 TO 5 STEPS WITH RAILING: A LITTLE
WALKING IN HOSPITAL ROOM: A LITTLE
STANDING UP FROM CHAIR USING ARMS: A LITTLE
DAILY ACTIVITIY SCORE: 22
MOBILITY SCORE: 20
DRESSING REGULAR LOWER BODY CLOTHING: A LITTLE
WALKING IN HOSPITAL ROOM: A LITTLE
CLIMB 3 TO 5 STEPS WITH RAILING: A LITTLE
MOBILITY SCORE: 22
MOVING TO AND FROM BED TO CHAIR: A LITTLE

## 2025-07-18 ASSESSMENT — PAIN SCALES - GENERAL
PAINLEVEL_OUTOF10: 0 - NO PAIN

## 2025-07-18 ASSESSMENT — PAIN - FUNCTIONAL ASSESSMENT
PAIN_FUNCTIONAL_ASSESSMENT: 0-10

## 2025-07-18 NOTE — CARE PLAN
Pt had unchanged neuro checks throughout the shift. Pt remained A/O x4, PERRLA, sensation and motor function intact.  VS stable. Remained NSR, maintained room air. No complaints of pain during the night. Up to RR with standby, tolerates activity well. Safety maintained.    Problem: Discharge Planning  Goal: Discharge to home or other facility with appropriate resources  7/18/2025 0524 by Herlinda Connolly RN  Outcome: Progressing     Problem: Pain - Adult  Goal: Verbalizes/displays adequate comfort level or baseline comfort level  7/18/2025 0524 by Herlinda Connolly RN  Outcome: Progressing     Problem: Safety - Adult  Goal: Free from fall injury  7/18/2025 0524 by Herlinda Connolly RN  Outcome: Progressing     Problem: Chronic Conditions and Co-morbidities  Goal: Patient's chronic conditions and co-morbidity symptoms are monitored and maintained or improved  7/18/2025 0524 by Herlinda Connolly RN  Outcome: Progressing     Problem: Nutrition  Goal: Nutrient intake appropriate for maintaining nutritional needs  Outcome: Progressing     Problem: Fall/Injury  Goal: Not fall by end of shift  Outcome: Progressing     Problem: Fall/Injury  Goal: Be free from injury by end of the shift  Outcome: Progressing     Problem: Fall/Injury  Goal: Verbalize understanding of personal risk factors for fall in the hospital  Outcome: Progressing    The clinical goals for the shift include Pt will have unchanged neuro checks throughout the shift.

## 2025-07-18 NOTE — PROGRESS NOTES
07/18/25 0848   Discharge Planning   Assistance Needed agreeable to [facility]. Order placed   Intensity of Service   Intensity of Service 0-30 min

## 2025-07-18 NOTE — PROGRESS NOTES
Internal Medicine Progress Note    Patient Name: Radha Boone          MRN: [id number]  Today's Date: July 18, 2025          Attending: Kingsley Fisher MD    Subjective:  Patient was seen and examined at bedside.    Review Of Systems:  GENERAL: No malaise or fevers.  CARDIOVASCULAR: Negative for chest pain, leg swelling  RESPIRATORY: Negative for shortness of breath, cough, wheezing  GI: No nausea, vomiting, or diarrhea  MUSCULOSKELETAL: Negative for joint pain or swelling, back pain or muscle pain  The remainder of the review of systems is negative.      Objective:  Vitals:    07/18/25 0140 07/18/25 0455 07/18/25 0623 07/18/25 0809   BP:  (!) 177/91  (!) 170/92   BP Location:  Left arm  Left arm   Patient Position:  Lying  Lying   Pulse: 106 70 76 70   Resp: 22 15 19 18   Temp:  36.1 °C (97 °F)  36.2 °C (97.2 °F)   TempSrc:  Temporal  Temporal   SpO2:  95%  95%   Weight:  78.3 kg (172 lb 9.9 oz)     Height:               Physical Exam:   General appearance: Well-appearing alert, in no acute distress and well-hydrated, well nourished  Skin: Skin color, texture, turgor normal, no suspicious rashes or lesions  Head: normal  Eyes: Anicteric sclera. Pupils are equally round and reactive to light.  Extraocular movements are intact.   Ears: external ears normal, canals clear, TM's normal  Nose/Sinuses: Negative  Oropharynx: Lips, mucosa, and tongue normal, teeth and gums normal, oropharynx normal  Neck: Supple, no adenopathy; thyroid symmetric, normal size, no bruits  Lungs: Clear to auscultation, no wheezing or rhonchi  Heart: RRR without murmur, gallop, or rubs.  No ectopy  Abdomen: Soft, non-tender. Bowel sounds normal.  Extremities: No deformity, no edema  Peripheral pulses: Normal  Neuro: Alert oriented x3, no focal deficit.             Labs:  Results for orders placed or performed during the hospital encounter of 07/13/25 (from the past 24 hours)   POCT GLUCOSE   Result Value Ref Range    POCT Glucose 146 (H) 74 - 99  "mg/dL   POCT GLUCOSE   Result Value Ref Range    POCT Glucose 81 74 - 99 mg/dL   POCT GLUCOSE   Result Value Ref Range    POCT Glucose 118 (H) 74 - 99 mg/dL   Basic Metabolic Panel   Result Value Ref Range    Glucose 111 (H) 74 - 99 mg/dL    Sodium 139 136 - 145 mmol/L    Potassium 4.1 3.5 - 5.3 mmol/L    Chloride 108 (H) 98 - 107 mmol/L    Bicarbonate 25 21 - 32 mmol/L    Anion Gap 10 10 - 20 mmol/L    Urea Nitrogen 23 6 - 23 mg/dL    Creatinine 1.21 0.50 - 1.30 mg/dL    eGFR 59 (L) >60 mL/min/1.73m*2    Calcium 8.7 8.6 - 10.3 mg/dL   POCT GLUCOSE   Result Value Ref Range    POCT Glucose 112 (H) 74 - 99 mg/dL       Medications:  Scheduled medications  Scheduled Medications[1]  Continuous medications  Continuous Medications[2]  PRN medications  PRN Medications[3]      Assessment/Plan:  Assessment & Plan  TIA (transient ischemic attack)  Continue aspirin, Plavix, Lipitor  PT/OT    Mixed hyperlipidemia  Continue atorvastatin  Hypertension  Blood pressure is still uncontrolled  Continue valsartan, hydralazine  Monitor blood pressure  Asymptomatic stenosis of right vertebral artery  Carotid stenosis  Continue aspirin and Lipitor  Will start patient on Lovenox 1 mg/kg  Waiting for cardiology evaluation    Discussed with patient, RN    Kingsley Fisher MD   Date: 07/18/25  Time: 8:36 AM    This note was partially created using voice recognition software and is inherently subject to errors including those of syntax and \"sound-alike\" substitutions which may escape proofreading. In such instances, original meaning may be extrapolated by contextual derivation         [1] aspirin, 81 mg, oral, Daily  atorvastatin, 80 mg, oral, Nightly  clopidogrel, 75 mg, oral, Daily  enoxaparin, 1 mg/kg, subcutaneous, q12h  hydrALAZINE, 25 mg, oral, BID  insulin lispro, 0-5 Units, subcutaneous, With meals & nightly  valsartan, 320 mg, oral, Daily  [2]    [3] PRN medications: oxygen    "

## 2025-07-18 NOTE — PROGRESS NOTES
Physical Therapy    Physical Therapy    Physical Therapy Treatment    Patient Name: Radha Boone  MRN: [id number]  Today's Date: 7/18/2025  Time Calculation  Start Time: 0930  Stop Time: 0954  Time Calculation (min): 24 min     3010/3010-A     07/18/25 0930   PT  Visit   PT Received On 07/18/25   Response to Previous Treatment Patient with no complaints from previous session.   General   Reason for Referral ADLs, discharge planning   Referred By Dr. Kaur   Past Medical History Relevant to Rehab Pt admitted with RUE paresthesias, dysarthria, and aphasia. Hypertensive at 250/125 upon admission. CT: moderate stenosis of mid-distal basilar artery. TNK not indicated. 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   Family/Caregiver Present Yes   Caregiver Feedback spouse   Prior to Session Communication Bedside nurse   Patient Position Received Bed, 2 rail up;Alarm on   General Comment Pt agreeable to therapy and cleared for participation   Precautions   Medical Precautions Fall precautions   Vital Signs   BP (!) 170/92  (nurse aware)   Pain Assessment   Pain Assessment 0-10   0-10 (Numeric) Pain Score 0 - No pain   Cognition   Overall Cognitive Status WFL   Orientation Level Oriented X4   Balance/Neuromuscular Re-Education   Balance/Neuromuscular Re-Education Activity Performed Yes   Balance/Neuromuscular Re-Education Activity 1 static and dynamic standing balance w/o UE support and using unilateral UE support for increased safety and stabiltiy. SUP   Bed Mobility   Bed Mobility Yes   Bed Mobility 1   Bed Mobility 1 Supine to sitting   Level of Assistance 1 Independent   Bed Mobility Comments 1 HOB elevated   Bed Mobility 2   Bed Mobility  2 Sitting to supine   Level of Assistance 2 Independent   Bed Mobility Comments 2 bed flat   Ambulation/Gait Training   Ambulation/Gait Training Performed Yes   Ambulation/Gait Training 1    Surface 1 Level tile   Device 1 Rolling walker   Gait Support Devices   (declined gait belt)   Assistance 1 Close supervision   Quality of Gait 1 WBOS;Decreased step length   Comments/Distance (ft) 1 80' slow reciprocal gait, no c/o dizziness. Pt asked to use WW, wife states that pt does not use WW at home. Pt states he feels better using WW at this time d/t incresed weakness with hospital stay.   Transfers   Transfer Yes   Transfer 1   Transfer From 1 Bed to   Transfer to 1 Stand;Sit;Bed   Technique 1 Sit to stand;Stand to sit   Transfer Level of Assistance 1 Distant supervision;Close supervision   Trials/Comments 1 x8 including x5 consecutive STS transfers for increased strength and stabiltiy wt functional mobility. Grossly steady   Activity Tolerance   Endurance Endurance does not limit participation in activity   PT Assessment   PT Assessment Results Impaired balance;Decreased mobility;Decreased endurance   Rehab Prognosis Good   Evaluation/Treatment Tolerance Patient tolerated treatment well   End of Session Communication Bedside nurse   Assessment Comment Pt put forth good effort with no c/o pain.   End of Session Patient Position Bed, 2 rail up;Alarm on       Outcome Measures:  [facility] Basic Mobility  Turning from your back to your side while in a flat bed without using bedrails: None  Moving from lying on your back to sitting on the side of a flat bed without using bedrails: None  Moving to and from bed to chair (including a wheelchair): A little  Standing up from a chair using your arms (e.g. wheelchair or bedside chair): A little  To walk in hospital room: A little  Climbing 3-5 steps with railing: A little  Basic Mobility - Total Score: 20                             EDUCATION:     Education Documentation  Precautions, taught by Beryl Cabrales PTA at 7/18/2025 11:24 AM.  Learner: Family, Patient  Readiness: Acceptance  Method: Explanation, Demonstration  Response: Verbalizes Understanding, Demonstrated  Understanding    Mobility Training, taught by Beryl Cabrales PTA at 7/18/2025 11:24 AM.  Learner: Family, Patient  Readiness: Acceptance  Method: Explanation, Demonstration  Response: Verbalizes Understanding, Demonstrated Understanding    Education Comments  No comments found.        GOALS:  Encounter Problems       Encounter Problems (Active)       PT Problem       Balance (Progressing)       Start:  07/14/25    Expected End:  07/28/25       Pt will improve dynamic standing balance to Good in order to improve safety with functional tasks.           Transfers (Progressing)       Start:  07/14/25    Expected End:  07/28/25       Pt will transfer sit<>stand with IND           Gait (Progressing)       Start:  07/14/25    Expected End:  07/28/25       Pt will  Feet with no assistive device And IND, demonstrating safety with 180 degree turns.

## 2025-07-21 ENCOUNTER — PATIENT OUTREACH (OUTPATIENT)
Dept: CARE COORDINATION | Age: 85
End: 2025-07-21

## 2025-07-21 ENCOUNTER — APPOINTMENT (OUTPATIENT)
Dept: CARE COORDINATION | Age: 85
End: 2025-07-21
Payer: MEDICARE

## 2025-07-21 ENCOUNTER — PATIENT OUTREACH (OUTPATIENT)
Dept: CARE COORDINATION | Facility: CLINIC | Age: 85
End: 2025-07-21
Payer: MEDICARE

## 2025-07-21 DIAGNOSIS — I63.211 CEREBROVASCULAR ACCIDENT (CVA) DUE TO STENOSIS OF RIGHT VERTEBRAL ARTERY (MULTI): ICD-10-CM

## 2025-07-21 DIAGNOSIS — I82.90 DEEP VEIN THROMBOSIS (DVT) OF NON-EXTREMITY VEIN, UNSPECIFIED CHRONICITY: ICD-10-CM

## 2025-07-21 DIAGNOSIS — R73.03 PREDIABETES: Primary | ICD-10-CM

## 2025-07-21 DIAGNOSIS — I10 HYPERTENSION, UNSPECIFIED TYPE: ICD-10-CM

## 2025-07-21 SDOH — SOCIAL STABILITY: SOCIAL NETWORK: HOW OFTEN DO YOU GET TOGETHER WITH FRIENDS OR RELATIVES?: ONCE A WEEK

## 2025-07-21 SDOH — HEALTH STABILITY: PHYSICAL HEALTH: ON AVERAGE, HOW MANY DAYS PER WEEK DO YOU ENGAGE IN MODERATE TO STRENUOUS EXERCISE (LIKE A BRISK WALK)?: 0 DAYS

## 2025-07-21 SDOH — SOCIAL STABILITY: SOCIAL NETWORK
DO YOU BELONG TO ANY CLUBS OR ORGANIZATIONS SUCH AS CHURCH GROUPS, UNIONS, FRATERNAL OR ATHLETIC GROUPS, OR SCHOOL GROUPS?: PATIENT DECLINED

## 2025-07-21 SDOH — SOCIAL STABILITY: SOCIAL NETWORK: HOW OFTEN DO YOU ATTEND MEETINGS OF THE CLUBS OR ORGANIZATIONS YOU BELONG TO?: NEVER

## 2025-07-21 SDOH — SOCIAL STABILITY: SOCIAL INSECURITY: ARE YOU MARRIED, WIDOWED, DIVORCED, SEPARATED, NEVER MARRIED, OR LIVING WITH A PARTNER?: MARRIED

## 2025-07-21 SDOH — HEALTH STABILITY: PHYSICAL HEALTH: ON AVERAGE, HOW MANY MINUTES DO YOU ENGAGE IN EXERCISE AT THIS LEVEL?: 0 MIN

## 2025-07-21 SDOH — HEALTH STABILITY: PHYSICAL HEALTH
HOW OFTEN DO YOU NEED TO HAVE SOMEONE HELP YOU WHEN YOU READ INSTRUCTIONS, PAMPHLETS, OR OTHER WRITTEN MATERIAL FROM YOUR DOCTOR OR PHARMACY?: NEVER

## 2025-07-21 SDOH — SOCIAL STABILITY: SOCIAL NETWORK: IN A TYPICAL WEEK, HOW MANY TIMES DO YOU TALK ON THE PHONE WITH FAMILY, FRIENDS, OR NEIGHBORS?: ONCE A WEEK

## 2025-07-21 SDOH — HEALTH STABILITY: MENTAL HEALTH
DO YOU FEEL STRESS - TENSE, RESTLESS, NERVOUS, OR ANXIOUS, OR UNABLE TO SLEEP AT NIGHT BECAUSE YOUR MIND IS TROUBLED ALL THE TIME - THESE DAYS?: ONLY A LITTLE

## 2025-07-21 SDOH — SOCIAL STABILITY: SOCIAL NETWORK: HOW OFTEN DO YOU ATTEND CHURCH OR RELIGIOUS SERVICES?: PATIENT DECLINED

## 2025-07-21 NOTE — PROGRESS NOTES
Patient phoned in to enroll.   He only has 2 bp pills left, requesting refills.   Reviewed med list, he states he is NOT taking Coumadin anymore.   He cancelled INR appt. That was at [facility].   VERY Limited documentation from [facility], unclear DC instruction, no documents in EPIC.   Patient states needs refill on hydralazine, denies getting any refills at [place].   Lives at home with spouse, takes bp daily.   He states PCP is at , Dr. JAME Fisher, needs appt.   He is going for heart monitor placement tomorrow.   [facility] scheduled.   Pharm: [facility] [place] [address].

## 2025-07-21 NOTE — PROGRESS NOTES
Outreach call to introduce myself to the patient and to support a smooth transition of care from recent admission.  Left voicemail message for patient with my contact information.    Adilia CARRANZA, RN, [facility]  O: [phone]

## 2025-07-22 ENCOUNTER — HOSPITAL ENCOUNTER (OUTPATIENT)
Dept: CARDIOLOGY | Facility: HOSPITAL | Age: 85
Discharge: HOME | End: 2025-07-22
Payer: MEDICARE

## 2025-07-22 ENCOUNTER — DOCUMENTATION (OUTPATIENT)
Dept: CARE COORDINATION | Age: 85
End: 2025-07-22

## 2025-07-22 ENCOUNTER — PATIENT OUTREACH (OUTPATIENT)
Dept: CARE COORDINATION | Age: 85
End: 2025-07-22

## 2025-07-22 ENCOUNTER — TELEPHONE (OUTPATIENT)
Dept: CARE COORDINATION | Age: 85
End: 2025-07-22

## 2025-07-22 DIAGNOSIS — I63.211 CEREBROVASCULAR ACCIDENT (CVA) DUE TO STENOSIS OF RIGHT VERTEBRAL ARTERY (MULTI): ICD-10-CM

## 2025-07-22 DIAGNOSIS — R42 DIZZINESS: ICD-10-CM

## 2025-07-22 DIAGNOSIS — I1A.0 RESISTANT HYPERTENSION: Primary | ICD-10-CM

## 2025-07-22 PROCEDURE — 93270 REMOTE 30 DAY ECG REV/REPORT: CPT

## 2025-07-22 RX ORDER — HYDRALAZINE HYDROCHLORIDE 25 MG/1
25 TABLET, FILM COATED ORAL 2 TIMES DAILY
Qty: 180 TABLET | Refills: 2 | Status: SHIPPED | OUTPATIENT
Start: 2025-07-22

## 2025-07-22 NOTE — PROGRESS NOTES
Incoming call from patient after missed initial HHVC today. Patient states he needs a refill of hydralazine, he would like it placed at [facility] on [address] in [facility]. Secure chat to JI Peña and Lindsey Soto, PharmD. Rescheduled initial HHVC 7/23/25 at 0830, he verbalized understanding. No other questions at this time.

## 2025-07-22 NOTE — PROGRESS NOTES
[facility] Initial Call Note: Jesenia STEVENS CNP; Lindsey Soto PharmD:    L[facility]       Last 3 Weights:  Wt Readings from Last 7 Encounters:   07/18/25 78.3 kg (172 lb 9.9 oz)   12/09/20 79.4 kg (175 lb)       Masimo Device: No   Masimo Clinical Impression:     Virtual Visits--Scheduled (Most Recent Date at Top)  Follow up Appointments  Recent Visits  No visits were found meeting these conditions.  Showing recent visits within past 30 days and meeting all other requirements  Future Appointments  No visits were found meeting these conditions.  Showing future appointments within next 90 days and meeting all other requirements

## 2025-07-23 ENCOUNTER — TELEMEDICINE (OUTPATIENT)
Dept: PHARMACY | Facility: HOSPITAL | Age: 85
End: 2025-07-23
Payer: MEDICARE

## 2025-07-23 ENCOUNTER — PATIENT OUTREACH (OUTPATIENT)
Dept: CARE COORDINATION | Age: 85
End: 2025-07-23

## 2025-07-23 VITALS — SYSTOLIC BLOOD PRESSURE: 155 MMHG | HEART RATE: 60 BPM | DIASTOLIC BLOOD PRESSURE: 86 MMHG

## 2025-07-23 DIAGNOSIS — R73.03 PREDIABETES: Primary | ICD-10-CM

## 2025-07-23 RX ORDER — IBUPROFEN 200 MG
CAPSULE ORAL
Qty: 25 EACH | Refills: 3 | Status: SHIPPED | OUTPATIENT
Start: 2025-07-23

## 2025-07-23 RX ORDER — DEXTROSE 4 G
TABLET,CHEWABLE ORAL
Qty: 1 EACH | Refills: 0 | Status: SHIPPED | OUTPATIENT
Start: 2025-07-23

## 2025-07-23 RX ORDER — LANCETS
EACH MISCELLANEOUS
Qty: 50 EACH | Refills: 3 | Status: SHIPPED | OUTPATIENT
Start: 2025-07-23

## 2025-07-23 NOTE — PROGRESS NOTES
Initial [facility] call completed with Charo Carrasco NP and Lindsey from Pharmacy. Patient states that he is doing pretty good since his DC from the hospital. He has not been having any symptoms since dc. Patient requested a refill for his hydralazine that was called in yesterday. Patient will  today. BP this am 155/86 HR 60 around the same time he took his medications. Reviewed medication changes since hospital dc confirmed he is not taking coumadin he is taking the plavix and ASA. Patient has a halter monitor on currently. He has a PCP follow up in October. He is going to change to  PCP and Neurology referrals are in. Patient has had an elevated A1c he is not currently taking any meds or checking his glucose we will place an order for a glucometer advised to check daily and watching his intake. Medication reviewed with Pharmacy. Ill assist with appointments will continue daily calls and Next [facility] 7/30 @ 0830

## 2025-07-23 NOTE — PROGRESS NOTES
Healthy at Home [facility]    Radha Boone is a 84 y.o. male was referred to Clinical Pharmacy Team to complete a post-discharge medication optimization and monitoring visit.  The patient was referred for multiple concerns while in [facility]. Today was our initial visit.     Admission Date: 7/13  Discharge Date: 7/18    Referring Provider: Jesenia Carrasco APRN*  PCP: Kingsley Fisher MD - outside of        Subjective   Allergies[1]    [id number] - [place] [id number] [address]  Phone: [phone] Fax: [phone]    [facility]  [address]  [address]  [place] [address]  Phone: [phone] Fax: [phone]      Medication System Management:  Affordability/Accessibility: no issues  Adherence/Organization: no concerns      Social History     Social History Narrative    Not on file        Notable Medication changes following discharge:  Start: metoprolol tartrate, plavix, lipitor   Stop: warfarin, pravastatin  Change: n/a    HPI      Review of Systems        Objective     There were no vitals taken for this visit.   BP Readings from Last 4 Encounters:   07/18/25 (!) 172/103   12/09/20 (!) 170/91      There were no vitals filed for this visit.     LAB  Lab Results   Component Value Date    BILITOT 0.6 07/14/2025    CALCIUM 8.7 07/18/2025    CO2 25 07/18/2025     (H) 07/18/2025    CREATININE 1.21 07/18/2025    GLUCOSE 111 (H) 07/18/2025    ALKPHOS 46 07/14/2025    K 4.1 07/18/2025    PROT 6.2 (L) 07/14/2025     07/18/2025    AST 13 07/14/2025    ALT 11 07/14/2025    BUN 23 07/18/2025    ANIONGAP 10 07/18/2025    MG 2.03 07/14/2025    PHOS 2.7 07/14/2025    ALBUMIN 3.8 07/14/2025     Lab Results   Component Value Date    TRIG 105 07/13/2025    CHOL 144 07/13/2025    LDLCALC 88 07/13/2025    HDL 34.7 07/13/2025     Lab Results   Component Value Date    HGBA1C 6.3 (H) 07/13/2025         Current Outpatient  Medications   Medication Instructions    aspirin 81 mg, oral, Nightly    atorvastatin (LIPITOR) 80 mg, oral, Nightly    clopidogrel (PLAVIX) 75 mg, oral, Daily    hydrALAZINE (APRESOLINE) 25 mg, oral, 2 times daily    metoprolol tartrate (LOPRESSOR) 25 mg, oral, 2 times daily    valsartan (DIOVAN) 320 mg, Nightly    vit C/E/Zn/coppr/lutein/zeaxan (PRESERVISION AREDS-2 ORAL) 1 capsule, 2 times daily        HISTORICAL PHARMACOTHERAPY      DRUG INTERACTIONS  None requiring intervention at this time      Assessment/Plan   Problem List Items Addressed This Visit    None    -doing okay since being home from hospital  -no dizziness/headache/aphasias  -had holter monitor placed yesterday   -was previously being followed by [facility] but now wants to switch over to    -denies any chest pain/SOB/leg swelling     -most recent A1c 6.3% (7/13/25)   -he is aware that he is considered pre-diabetic  -does not have any glucose testing supplies at home     /86 (~30-60 mins after meds)   HR 60    Medications Changes/Concerns:  Discharge medications:   1. metoprolol tartrate 25 mg BID    2. plavix 75 mg daily    3. lipitor 80 mg daily    -[facility]    -stop --> warfarin, pravastatin      CVA   Continue DAPT x 90 days with Plavix 75 mg daily + aspirin 81 mg daily   Will then continue aspirin 81 mg indefinitely   Continue Lipitor 80 mg daily   Continue metoprolol tartrate 25 mg BID   Continue valsartan 320 mg daily   Continue hydralazine 25 mg BID       Refills Needed:  -none needed today       Plan/To Do:  -referrals to PCP and neurology   -BG testing supplies sent to [facility]   -continue logging daily BP's   -encouraged to start checking BG's at least a couple times per week to start   -nursing will continue with daily calls        PAP Status:  -n/a - on all generics currently       Time Spent with Patient and [facility] Team - Jesenia Carrasco NP and IRINA Robin via phone call: 15 minutes     Follow Up: 1 week    Continue all  meds under the continuation of care with the referring provider and clinical pharmacy team.    Lindsey Soto, Javier     Verbal consent to manage patient's drug therapy was obtained from the patient. They were informed they may decline to participate or withdraw from participation in pharmacy services at any time.          [1]   Allergies  Allergen Reactions    Amlodipine Itching and Rash    Epinephrine-Chlorpheniramine Unknown

## 2025-07-24 ENCOUNTER — PATIENT OUTREACH (OUTPATIENT)
Dept: CARE COORDINATION | Age: 85
End: 2025-07-24
Payer: MEDICARE

## 2025-07-24 VITALS — DIASTOLIC BLOOD PRESSURE: 91 MMHG | HEART RATE: 61 BPM | SYSTOLIC BLOOD PRESSURE: 175 MMHG

## 2025-07-24 NOTE — PROGRESS NOTES
"Daily Call Note:     0810 - daily call completed with pt.  He states he is feeling \"just great\" today.  BP (!) 175/91   Pulse 61   He states that he's been busy this morning (\"trash day\").   Enc pt to re-check his BP once he is able to sit for a bit.  He agreed to call back if it continues to be a bit high.  He received his hydralazine and most of his glucometer supplies, the rest if to be delivered today.  Next [facility] is 7/30 at 0830.  No questions or concerns to address today.    Pt Education: POC  Barriers: none  Topics for Daily Review: sx; vs; meds; glucometer  Pt demonstrates clear understanding: Yes    Daily Weight:  There were no vitals filed for this visit.   Last 3 Weights:  Wt Readings from Last 7 Encounters:   07/18/25 78.3 kg (172 lb 9.9 oz)   12/09/20 79.4 kg (175 lb)       Masimo Device: No   Masimo Clinical Impression: n/a    Virtual Visits--Scheduled (Most Recent Date at Top)  Follow up Appointments  Recent Visits  No visits were found meeting these conditions.  Showing recent visits within past 30 days and meeting all other requirements  Future Appointments  No visits were found meeting these conditions.  Showing future appointments within next 90 days and meeting all other requirements       Frequency of RN Calls & Virtual Visits per Team Agreement: Healthy at Home Frequency: Daily    Medication issues Addressed (what was done): received hydralazine and most of the glucometer supplies    Follow up appointments scheduled by [facility] Staff: none  Referrals made by [facility] staff: none        "

## 2025-07-25 ENCOUNTER — PATIENT OUTREACH (OUTPATIENT)
Dept: CARE COORDINATION | Age: 85
End: 2025-07-25
Payer: MEDICARE

## 2025-07-25 VITALS — SYSTOLIC BLOOD PRESSURE: 157 MMHG | HEART RATE: 59 BPM | DIASTOLIC BLOOD PRESSURE: 77 MMHG

## 2025-07-25 ASSESSMENT — ENCOUNTER SYMPTOMS
LOSS OF SENSATION IN FEET: 0
DEPRESSION: 0
OCCASIONAL FEELINGS OF UNSTEADINESS: 1

## 2025-07-25 NOTE — PROGRESS NOTES
"Daily Call Note:   0955 - daily call completed with pt.  He states that he is feeling \"pretty good overall.\"  Will  the rest of his glucometer supplies today.  He requests that the call frequency go back to [facility] - feeling overwhelmed with the daily calls.  Educated pt that we'd like him to see his PCP (in addition to his cardiologist) due to his recent hospitalization - he verbalized understanding and declined assistance to make the appt.  /77   Pulse 59   Next [facility] is 7/30 at 0830.  No other questions or concerns to address.    Pt Education: POC  Barriers: none  Topics for Daily Review: sx; glucometer; call frequency; pcp appt; vs  Pt demonstrates clear understanding: Yes    Daily Weight:  There were no vitals filed for this visit.   Last 3 Weights:  Wt Readings from Last 7 Encounters:   07/18/25 78.3 kg (172 lb 9.9 oz)   12/09/20 79.4 kg (175 lb)       Masimo Device: No   Masimo Clinical Impression: n/a    Virtual Visits--Scheduled (Most Recent Date at Top)  Follow up Appointments  Recent Visits  No visits were found meeting these conditions.  Showing recent visits within past 30 days and meeting all other requirements  Future Appointments  No visits were found meeting these conditions.  Showing future appointments within next 90 days and meeting all other requirements       Frequency of RN Calls & Virtual Visits per Team Agreement: Healthy at Home Frequency: [facility]    Medication issues Addressed (what was done): none    Follow up appointments scheduled by [facility] Staff: none  Referrals made by [facility] staff: none        "

## 2025-07-28 ENCOUNTER — PATIENT OUTREACH (OUTPATIENT)
Dept: CARE COORDINATION | Age: 85
End: 2025-07-28
Payer: MEDICARE

## 2025-07-28 VITALS — HEART RATE: 65 BPM | SYSTOLIC BLOOD PRESSURE: 149 MMHG | DIASTOLIC BLOOD PRESSURE: 76 MMHG

## 2025-07-28 NOTE — PROGRESS NOTES
Daily Call Note: Daily call attempts - patient answered the phone but did not seem to be able to hear caller.  The patient had the volume lowered this was unable to hear.  He reported that he was doing well - he denied having had chest pain, SOB. S/S pf bleeding. He was instructed on S/S of bleeding and what to report should they occur. He verbalized understanding.  /76 - HR 75. He was educated on the importance of follow up with a PCP - his provider is non  - he confirmed that he will call today to schedule an appointment - he has the contact information for the call.    There were no vitals filed for this visit.   Last 3 Weights:  Wt Readings from Last 7 Encounters:   07/18/25 78.3 kg (172 lb 9.9 oz)   12/09/20 79.4 kg (175 lb)       Virtual Visits--Scheduled (Most Recent Date at Top)  Follow up Appointments  Recent Visits  No visits were found meeting these conditions.  Showing recent visits within past 30 days and meeting all other requirements  Future Appointments  No visits were found meeting these conditions.  Showing future appointments within next 90 days and meeting all other requirements

## 2025-07-29 DIAGNOSIS — R73.03 PREDIABETES: Primary | ICD-10-CM

## 2025-07-30 ENCOUNTER — PATIENT OUTREACH (OUTPATIENT)
Dept: CARE COORDINATION | Age: 85
End: 2025-07-30

## 2025-07-30 ENCOUNTER — TELEMEDICINE (OUTPATIENT)
Dept: PHARMACY | Facility: HOSPITAL | Age: 85
End: 2025-07-30
Payer: MEDICARE

## 2025-07-30 ENCOUNTER — APPOINTMENT (OUTPATIENT)
Dept: CARE COORDINATION | Age: 85
End: 2025-07-30
Payer: MEDICARE

## 2025-07-30 VITALS — SYSTOLIC BLOOD PRESSURE: 154 MMHG | DIASTOLIC BLOOD PRESSURE: 78 MMHG | HEART RATE: 62 BPM

## 2025-07-30 DIAGNOSIS — I63.212: ICD-10-CM

## 2025-07-30 DIAGNOSIS — R73.03 PREDIABETES: Primary | ICD-10-CM

## 2025-07-30 RX ORDER — ATORVASTATIN CALCIUM 80 MG/1
80 TABLET, FILM COATED ORAL NIGHTLY
Qty: 90 TABLET | Refills: 3 | Status: SHIPPED | OUTPATIENT
Start: 2025-07-30

## 2025-07-30 RX ORDER — METFORMIN HYDROCHLORIDE 500 MG/1
500 TABLET, EXTENDED RELEASE ORAL 2 TIMES DAILY
Qty: 180 TABLET | Refills: 3 | Status: SHIPPED | OUTPATIENT
Start: 2025-07-30

## 2025-07-30 RX ORDER — METOPROLOL TARTRATE 25 MG/1
25 TABLET, FILM COATED ORAL 2 TIMES DAILY
Qty: 180 TABLET | Refills: 3 | Status: SHIPPED | OUTPATIENT
Start: 2025-07-30

## 2025-07-30 RX ORDER — VALSARTAN 320 MG/1
320 TABLET ORAL NIGHTLY
Qty: 90 TABLET | Refills: 3 | Status: SHIPPED | OUTPATIENT
Start: 2025-07-30

## 2025-07-30 NOTE — PROGRESS NOTES
Daily Call Note:     [facility] call with the patient, Charo NP, Alfonso PharmD, and this RN  Pt states he is doing fine  States he needs refill on Valsartan  Pt denies any symptoms or issues   No leg swelling  Has had some dizziness occasionally  Pt /78 HR 62  -before meds  Advised to re-check BP  Pt , starting checking it 3 days ago  States he  has been having elevated readings @ 192, 160, and  231  Pt okay with starting Metformin and being mindful of sugar/sweets and carbs in his foods, declined Nutritionist or Diabetic Educator referral at the moment  Pt given the number to schedule an appt with a  Neurologist  Attempted to call PCP yesterday, will try again today  Pt had no add'l questions or concerns  Next [facility] 8/6 @ 0830      Pt Education:   Barriers:   Topics for Daily Review:   Pt demonstrates clear understanding:    Daily Weight:  There were no vitals filed for this visit.   Last 3 Weights:  Wt Readings from Last 7 Encounters:   07/18/25 78.3 kg (172 lb 9.9 oz)   12/09/20 79.4 kg (175 lb)       Masimo Device:    Masimo Clinical Impression:     Virtual Visits--Scheduled (Most Recent Date at Top)  Follow up Appointments  Recent Visits  No visits were found meeting these conditions.  Showing recent visits within past 30 days and meeting all other requirements  Future Appointments  No visits were found meeting these conditions.  Showing future appointments within next 90 days and meeting all other requirements       Frequency of RN Calls & Virtual Visits per Team Agreement:     Medication issues Addressed (what was done):     Follow up appointments scheduled by [facility] Staff:   Referrals made by [facility] staff:

## 2025-07-30 NOTE — PROGRESS NOTES
[facility]    Radha Boone is a 84 y.o. male was referred to Clinical Pharmacy Team to complete a post-discharge medication optimization and monitoring visit.  The patient was referred for multiple concerns while in [facility]. Today was our second visit.       Referring Provider: Jesenia Carrasco APRN*  PCP: Kingsley Fisher MD - not with        Subjective   Allergies[1]    [id number] - [facility] [address] [address] [address]  Phone: [phone] Fax: [phone]    [facility]  [address] [address]  [address]  [facility] [address]  Phone: [phone] Fax: [phone]      Medication System Management:  Affordability/Accessibility: no concerns currently  Adherence/Organization: no issues      Social History     Social History Narrative    Not on file          HPI      Review of Systems        Objective     There were no vitals taken for this visit.   BP Readings from Last 4 Encounters:   07/28/25 149/76   07/25/25 157/77   07/24/25 (!) 175/91   07/23/25 155/86      There were no vitals filed for this visit.     LAB  Lab Results   Component Value Date    BILITOT 0.6 07/14/2025    CALCIUM 8.7 07/18/2025    CO2 25 07/18/2025     (H) 07/18/2025    CREATININE 1.21 07/18/2025    GLUCOSE 111 (H) 07/18/2025    ALKPHOS 46 07/14/2025    K 4.1 07/18/2025    PROT 6.2 (L) 07/14/2025     07/18/2025    AST 13 07/14/2025    ALT 11 07/14/2025    BUN 23 07/18/2025    ANIONGAP 10 07/18/2025    MG 2.03 07/14/2025    PHOS 2.7 07/14/2025    ALBUMIN 3.8 07/14/2025     Lab Results   Component Value Date    TRIG 105 07/13/2025    CHOL 144 07/13/2025    LDLCALC 88 07/13/2025    HDL 34.7 07/13/2025     Lab Results   Component Value Date    HGBA1C 6.3 (H) 07/13/2025         Current Outpatient Medications   Medication Instructions    aspirin 81 mg, oral, Nightly    atorvastatin (LIPITOR) 80 mg, oral, Nightly    blood sugar diagnostic  (Blood Glucose Test) Use to check sugars once daily    blood-glucose meter misc Use to check blood sugar once daily    clopidogrel (PLAVIX) 75 mg, oral, Daily    hydrALAZINE (APRESOLINE) 25 mg, oral, 2 times daily    lancets misc Use to check sugars once daily    metoprolol tartrate (LOPRESSOR) 25 mg, oral, 2 times daily    valsartan (DIOVAN) 320 mg, Nightly    vit C/E/Zn/coppr/lutein/zeaxan (PRESERVISION AREDS-2 ORAL) 1 capsule, 2 times daily          Assessment/Plan   Problem List Items Addressed This Visit    None      CVA/HTN  Feeling good today overall  No chest pain or SOB  Seems like speech is not too bad while talking on phone visit today   Established with cardio   He is monitoring his BP's daily also   BP this morning before meds was 154/78, HR 62  DM  A1c was 6.3%  He did  new glucometer and supplies and has been checking his sugars past few days   Lowest sugar was his fasting this morning at 147  Other sugars have been: 192, 160 and highest of 231  Discussed trying to be more mindful of sugar/sweets and high carb foods but he was not interested in talking with nutritionist at this time       Medications Changes/Concerns:  CVA   Continue DAPT x 90 days with Plavix 75 mg daily + aspirin 81 mg daily   Will then continue aspirin 81 mg indefinitely   Continue Lipitor 80 mg daily   Continue metoprolol tartrate 25 mg BID   Continue valsartan 320 mg daily   Continue hydralazine 25 mg BID   DM --> start Metformin ER 500mg BID       Refills Needed:  -Valsartan      Plan/To Do:  -send refill and new prescription for Metformin to his [facility]   -continue to log sugars and BP's daily   -needs to schedule with pcp   -nursing will continue with daily calls       [facility] PAP Status:  -n/a  -on all generic/non-PAP medications currently       Time Sp[facility] with [facility] and [facility] - Jesenia Carrasco NP and Nunu HAAS RN via phone call: 15 minutes      Follow Up: 1 week     Continue all meds under the  continuation of care with the referring provider and clinical pharmacy team.    Alfonso Lopez, PharmD     Verbal consent to manage patient's drug therapy was obtained from the patient. They were informed they may decline to participate or withdraw from participation in pharmacy services at any time.          [1]   Allergies  Allergen Reactions    Amlodipine Itching and Rash    Epinephrine-Chlorpheniramine Unknown

## 2025-08-01 ENCOUNTER — PATIENT OUTREACH (OUTPATIENT)
Dept: CARE COORDINATION | Age: 85
End: 2025-08-01
Payer: MEDICARE

## 2025-08-01 VITALS — HEART RATE: 57 BPM | DIASTOLIC BLOOD PRESSURE: 78 MMHG | SYSTOLIC BLOOD PRESSURE: 160 MMHG

## 2025-08-01 NOTE — PROGRESS NOTES
"Daily Call Note:     Blood pressure 160/78   HR 57  Blood Sugar.     Blood sugar 165      Patient requested assistance with scheduling a New  PCP appointment.  Patient requested not be be scheduled with an PCP with is an  affiliate provider.   The [facility] team with assist with scheduling and call patient back on \"Monday\" , per patient's request.   A task was created for follow up.    UPDATE:   Scheduled New PCP appointment and updated PCP in [facility]  Eugenio Mayes [facility]   August 27, 2025 @ 13:40  [address] [place] [address]  The [facility] team will Notify patient, Monday, 8/4/25.  A task was created for follow up.          Pt Education:   Barriers:   Topics for Daily Review:   Pt demonstrates clear understanding:     Daily Weight:  There were no vitals filed for this visit.   Last 3 Weights:  Wt Readings from Last 7 Encounters:   07/18/25 78.3 kg (172 lb 9.9 oz)   12/09/20 79.4 kg (175 lb)       Masimo Device:    Masimo Clinical Impression:     Virtual Visits--Scheduled (Most Recent Date at Top)  Follow up Appointments  Recent Visits  No visits were found meeting these conditions.  Showing recent visits within past 30 days and meeting all other requirements  Future Appointments  No visits were found meeting these conditions.  Showing future appointments within next 90 days and meeting all other requirements       Frequency of RN Calls & Virtual Visits per Team Agreement:     Medication issues Addressed (what was done):     Follow up appointments scheduled by [facility] Staff:   Referrals made by [facility] staff:         "

## 2025-08-04 ENCOUNTER — PATIENT OUTREACH (OUTPATIENT)
Dept: CARE COORDINATION | Age: 85
End: 2025-08-04
Payer: MEDICARE

## 2025-08-04 VITALS — SYSTOLIC BLOOD PRESSURE: 154 MMHG | DIASTOLIC BLOOD PRESSURE: 75 MMHG | HEART RATE: 61 BPM

## 2025-08-04 NOTE — PROGRESS NOTES
Daily Call Note:     Daily call with the patient  States he is doing good  His /75, HR 61 before meds  His FBS 15  Notified pt of PCP marce on 8/27  Reminded of [facility] 8/6 @ 0830  No assistance needed with scheduling  No questions or concerns during the call     Pt Education:   Barriers:  Topics for Daily Review:   Pt demonstrates clear understanding:     Daily Weight:  There were no vitals filed for this visit.   Last 3 Weights:  Wt Readings from Last 7 Encounters:   07/18/25 78.3 kg (172 lb 9.9 oz)   12/09/20 79.4 kg (175 lb)       Masimo Device:    Masimo Clinical Impression:     Virtual Visits--Scheduled (Most Recent Date at Top)  Follow up Appointments  Recent Visits  No visits were found meeting these conditions.  Showing recent visits within past 30 days and meeting all other requirements  Future Appointments  Date Type Provider Dept   08/27/25 Appointment Eugenio Mayes DO [id number]   Showing future appointments within next 90 days and meeting all other requirements       Frequency of RN Calls & Virtual Visits per Team Agreement:     Medication issues Addressed (what was done):     Follow up appointments scheduled by [facility] Staff:   Referrals made by [facility] staff:

## 2025-08-05 DIAGNOSIS — R73.03 PREDIABETES: ICD-10-CM

## 2025-08-05 DIAGNOSIS — I63.212: Primary | ICD-10-CM

## 2025-08-06 ENCOUNTER — TELEMEDICINE (OUTPATIENT)
Dept: PHARMACY | Facility: HOSPITAL | Age: 85
End: 2025-08-06
Payer: MEDICARE

## 2025-08-06 ENCOUNTER — PATIENT OUTREACH (OUTPATIENT)
Dept: CARE COORDINATION | Age: 85
End: 2025-08-06

## 2025-08-06 ENCOUNTER — APPOINTMENT (OUTPATIENT)
Dept: CARE COORDINATION | Age: 85
End: 2025-08-06
Payer: MEDICARE

## 2025-08-06 VITALS — SYSTOLIC BLOOD PRESSURE: 149 MMHG | DIASTOLIC BLOOD PRESSURE: 89 MMHG | HEART RATE: 67 BPM

## 2025-08-06 DIAGNOSIS — I63.212: ICD-10-CM

## 2025-08-06 DIAGNOSIS — R73.03 PREDIABETES: Primary | ICD-10-CM

## 2025-08-06 RX ORDER — METFORMIN HYDROCHLORIDE 500 MG/1
1000 TABLET, EXTENDED RELEASE ORAL 2 TIMES DAILY
Qty: 360 TABLET | Refills: 3 | Status: SHIPPED | OUTPATIENT
Start: 2025-08-06

## 2025-08-06 NOTE — PROGRESS NOTES
[facility], pt and myself. Pt states he has been doing good. Today /89, HR 67, glucose 199. Pt has started taking metformin BID. His glucose 199 was in morning before he ate anything. Prior days, glucose 140, 160.... Pt states he stays away from sugary foods, instructed to limit carbs as well- pasta, potatoes, sauces...... We would like to see glucose 100-125 first thing in the morning. We will increase metformin to 1000 mg BID (2 tabs in AM and 2 tabs PM). Pt states he is wearing a halter heart monitor now during the day and he has been taking off at night to let it charge. Instructed pt to try to charge monitor quickly (hour or so) and wear during day and at night. Pt has PCP appt 8/27. Pt had a  cardiologist, is now switching to . Pt had no questions or concerns for our team at this time. Next [facility] 8/13 @ 1207

## 2025-08-06 NOTE — PROGRESS NOTES
[facility]    Radha Boone is a 84 y.o. male was referred to Clinical Pharmacy Team to complete a post-discharge medication optimization and monitoring visit.  The patient was referred for multiple concerns while in [facility]. Today was our third visit.       Referring Provider: Jesenia Carrasco APRN*  PCP: Eugenio Mayes, DO - next visit: 8/27      Subjective   Allergies[1]    [id number] - [place] [id number] [address]  Phone: [phone] Fax: [phone]    [facility]  [address] [address]  [address] [place], [place] [id number]  Phone: [phone] Fax: [phone]      Medication System Management:  Affordability/Accessibility: no concerns currently   Adherence/Organization: no issues       Social History     Social History Narrative    Not on file          HPI      Review of Systems        Objective     There were no vitals taken for this visit.   BP Readings from Last 4 Encounters:   08/04/25 154/75   08/01/25 160/78   07/30/25 154/78   07/28/25 149/76      There were no vitals filed for this visit.     LAB  Lab Results   Component Value Date    BILITOT 0.6 07/14/2025    CALCIUM 8.7 07/18/2025    CO2 25 07/18/2025     (H) 07/18/2025    CREATININE 1.21 07/18/2025    GLUCOSE 111 (H) 07/18/2025    ALKPHOS 46 07/14/2025    K 4.1 07/18/2025    PROT 6.2 (L) 07/14/2025     07/18/2025    AST 13 07/14/2025    ALT 11 07/14/2025    BUN 23 07/18/2025    ANIONGAP 10 07/18/2025    MG 2.03 07/14/2025    PHOS 2.7 07/14/2025    ALBUMIN 3.8 07/14/2025     Lab Results   Component Value Date    TRIG 105 07/13/2025    CHOL 144 07/13/2025    LDLCALC 88 07/13/2025    HDL 34.7 07/13/2025     Lab Results   Component Value Date    HGBA1C 6.3 (H) 07/13/2025         Current Outpatient Medications   Medication Instructions    aspirin 81 mg, oral, Nightly    atorvastatin (LIPITOR) 80 mg, oral, Nightly    blood sugar  diagnostic (Blood Glucose Test) Use to check sugars once daily    blood-glucose meter misc Use to check blood sugar once daily    clopidogrel (PLAVIX) 75 mg, oral, Daily    hydrALAZINE (APRESOLINE) 25 mg, oral, 2 times daily    lancets misc Use to check sugars once daily    metFORMIN XR (GLUCOPHAGE-XR) 500 mg, oral, 2 times daily, Do not crush, chew, or split.    metoprolol tartrate (LOPRESSOR) 25 mg, oral, 2 times daily    valsartan (DIOVAN) 320 mg, oral, Nightly    vit C/E/Zn/coppr/lutein/zeaxan (PRESERVISION AREDS-2 ORAL) 1 capsule, 2 times daily          Assessment/Plan   Problem List Items Addressed This Visit    None      CVA/HTN  Feeling good today overall  No chest pain or SOB  Seems like speech is not too bad while talking on phone visit today   Established with cardio   He is monitoring his BP's daily also   BP this morning before meds was 149/89, HR 67  Currently wearing holter monitor too   DM  A1c was 6.3%  He did  new glucometer and supplies and has been checking his sugars past few days   Fasting today was 199  Lowest sugar this past week was 134  No hypo events   Discussed trying to be more mindful of sugar/sweets and high carb foods but he was not interested in talking with nutritionist at this time       Medications Changes/Concerns:  CVA   Continue DAPT x 90 days with Plavix 75 mg daily + aspirin 81 mg daily   Will then continue aspirin 81 mg indefinitely   Continue Lipitor 80 mg daily   Continue metoprolol tartrate 25 mg BID   Continue valsartan 320 mg daily   Continue hydralazine 25 mg BID   DM --> increase Metformin to 1,000mg ER BID       Refills Needed:  -none needed today       Plan/To Do:  -continue to log BP's and sugars daily   -continue to wear holter monitor   -nursing to continue with calls throughout the week       [facility] PAP Status:  -n/a  -on all generic/non-PAP medications currently       [facility] - Jesenia Carrasco NP and Arianna RN via phone call: 15  minutes      Follow Up: 1 week     Continue all meds under the continuation of care with the referring provider and clinical pharmacy team.    Alfonso Lopez PharmD     Verbal consent to manage patient's drug therapy was obtained from the patient. They were informed they may decline to participate or withdraw from participation in pharmacy services at any time.          [1]   Allergies  Allergen Reactions    Amlodipine Itching and Rash    Epinephrine-Chlorpheniramine Unknown

## 2025-08-08 ENCOUNTER — PATIENT OUTREACH (OUTPATIENT)
Dept: CARE COORDINATION | Age: 85
End: 2025-08-08
Payer: MEDICARE

## 2025-08-08 VITALS — DIASTOLIC BLOOD PRESSURE: 81 MMHG | HEART RATE: 65 BPM | SYSTOLIC BLOOD PRESSURE: 148 MMHG

## 2025-08-08 PROBLEM — R35.1 NOCTURIA: Status: ACTIVE | Noted: 2025-08-08

## 2025-08-08 PROBLEM — D12.6 TUBULAR ADENOMA OF COLON: Status: ACTIVE | Noted: 2025-08-08

## 2025-08-08 PROBLEM — H35.30 MACULAR DEGENERATION: Status: ACTIVE | Noted: 2025-08-08

## 2025-08-08 PROBLEM — R91.8 MULTIPLE LUNG NODULES ON CT: Status: ACTIVE | Noted: 2025-08-08

## 2025-08-08 PROBLEM — R42 DIZZINESS: Status: ACTIVE | Noted: 2025-08-08

## 2025-08-08 PROBLEM — S09.90XA HEAD INJURY: Status: ACTIVE | Noted: 2025-08-08

## 2025-08-08 PROBLEM — G45.4 TRANSIENT GLOBAL AMNESIA: Status: ACTIVE | Noted: 2025-08-08

## 2025-08-08 PROBLEM — Z86.39 HISTORY OF HYPERCHOLESTEROLEMIA: Status: ACTIVE | Noted: 2025-08-08

## 2025-08-08 PROBLEM — Z97.4 WEARS HEARING AID IN BOTH EARS: Status: ACTIVE | Noted: 2025-08-08

## 2025-08-08 PROBLEM — R06.02 SHORTNESS OF BREATH: Status: ACTIVE | Noted: 2025-08-08

## 2025-08-08 PROBLEM — M70.30 BURSITIS OF ELBOW: Status: ACTIVE | Noted: 2025-08-08

## 2025-08-08 PROBLEM — R94.31 ABNORMAL EKG: Status: ACTIVE | Noted: 2025-08-08

## 2025-08-08 PROBLEM — A08.4: Status: ACTIVE | Noted: 2025-08-08

## 2025-08-08 PROBLEM — Z86.73 HISTORY OF CEREBROVASCULAR ACCIDENT: Status: ACTIVE | Noted: 2025-08-08

## 2025-08-08 PROBLEM — Z86.69 HISTORY OF GLAUCOMA: Status: ACTIVE | Noted: 2025-08-08

## 2025-08-08 PROBLEM — M66.18 RUPTURED SYNOVIAL CYST OF KNEE: Status: ACTIVE | Noted: 2025-08-08

## 2025-08-08 NOTE — PROGRESS NOTES
Patient phoned in returning daily call. Denies any concerns, reports vitals and BG. Denies any new symptoms. Reviewed metformin dose, he verbalizes understanding.   Next call reviewed.

## 2025-08-11 ENCOUNTER — PATIENT OUTREACH (OUTPATIENT)
Dept: CARE COORDINATION | Age: 85
End: 2025-08-11

## 2025-08-11 ENCOUNTER — APPOINTMENT (OUTPATIENT)
Dept: PRIMARY CARE | Facility: CLINIC | Age: 85
End: 2025-08-11
Payer: MEDICARE

## 2025-08-11 VITALS
SYSTOLIC BLOOD PRESSURE: 154 MMHG | HEIGHT: 68 IN | DIASTOLIC BLOOD PRESSURE: 70 MMHG | WEIGHT: 176 LBS | HEART RATE: 60 BPM | BODY MASS INDEX: 26.67 KG/M2

## 2025-08-11 VITALS — HEART RATE: 59 BPM | DIASTOLIC BLOOD PRESSURE: 81 MMHG | SYSTOLIC BLOOD PRESSURE: 157 MMHG

## 2025-08-11 DIAGNOSIS — R09.89 LABILE HYPERTENSION: Primary | ICD-10-CM

## 2025-08-11 DIAGNOSIS — R73.03 PREDIABETES: ICD-10-CM

## 2025-08-11 PROCEDURE — 1159F MED LIST DOCD IN RCRD: CPT | Performed by: FAMILY MEDICINE

## 2025-08-11 PROCEDURE — 3077F SYST BP >= 140 MM HG: CPT | Performed by: FAMILY MEDICINE

## 2025-08-11 PROCEDURE — 1111F DSCHRG MED/CURRENT MED MERGE: CPT | Performed by: FAMILY MEDICINE

## 2025-08-11 PROCEDURE — 99204 OFFICE O/P NEW MOD 45 MIN: CPT | Performed by: FAMILY MEDICINE

## 2025-08-11 PROCEDURE — 1036F TOBACCO NON-USER: CPT | Performed by: FAMILY MEDICINE

## 2025-08-11 PROCEDURE — 3078F DIAST BP <80 MM HG: CPT | Performed by: FAMILY MEDICINE

## 2025-08-11 RX ORDER — VALSARTAN AND HYDROCHLOROTHIAZIDE 320; 12.5 MG/1; MG/1
1 TABLET, FILM COATED ORAL DAILY
Qty: 90 TABLET | Refills: 1 | Status: SHIPPED | OUTPATIENT
Start: 2025-08-11 | End: 2026-02-07

## 2025-08-11 ASSESSMENT — ENCOUNTER SYMPTOMS
DIZZINESS: 0
FATIGUE: 0
HEADACHES: 0
CHILLS: 0
SHORTNESS OF BREATH: 0
CHEST TIGHTNESS: 0

## 2025-08-11 ASSESSMENT — PATIENT HEALTH QUESTIONNAIRE - PHQ9
1. LITTLE INTEREST OR PLEASURE IN DOING THINGS: NOT AT ALL
SUM OF ALL RESPONSES TO PHQ9 QUESTIONS 1 AND 2: 0
2. FEELING DOWN, DEPRESSED OR HOPELESS: NOT AT ALL

## 2025-08-13 ENCOUNTER — PATIENT OUTREACH (OUTPATIENT)
Dept: CARE COORDINATION | Age: 85
End: 2025-08-13

## 2025-08-13 ENCOUNTER — APPOINTMENT (OUTPATIENT)
Dept: PHARMACY | Facility: HOSPITAL | Age: 85
End: 2025-08-13
Payer: MEDICARE

## 2025-08-13 ENCOUNTER — APPOINTMENT (OUTPATIENT)
Dept: CARE COORDINATION | Age: 85
End: 2025-08-13
Payer: MEDICARE

## 2025-08-13 VITALS — DIASTOLIC BLOOD PRESSURE: 81 MMHG | SYSTOLIC BLOOD PRESSURE: 149 MMHG | HEART RATE: 60 BPM

## 2025-08-15 ENCOUNTER — TELEMEDICINE (OUTPATIENT)
Dept: PHARMACY | Facility: HOSPITAL | Age: 85
End: 2025-08-15
Payer: MEDICARE

## 2025-08-15 ENCOUNTER — PATIENT OUTREACH (OUTPATIENT)
Dept: CARE COORDINATION | Age: 85
End: 2025-08-15

## 2025-08-15 ENCOUNTER — TELEMEDICINE (OUTPATIENT)
Dept: CARE COORDINATION | Age: 85
End: 2025-08-15
Payer: MEDICARE

## 2025-08-15 VITALS — DIASTOLIC BLOOD PRESSURE: 70 MMHG | HEART RATE: 72 BPM | SYSTOLIC BLOOD PRESSURE: 150 MMHG

## 2025-08-15 DIAGNOSIS — R73.03 PREDIABETES: ICD-10-CM

## 2025-08-15 DIAGNOSIS — I63.212: ICD-10-CM

## 2025-08-15 RX ORDER — METFORMIN HYDROCHLORIDE 500 MG/1
500 TABLET, EXTENDED RELEASE ORAL 2 TIMES DAILY
Qty: 180 TABLET | Refills: 3 | Status: SHIPPED | OUTPATIENT
Start: 2025-08-15

## 2025-08-16 ENCOUNTER — PATIENT OUTREACH (OUTPATIENT)
Dept: CARE COORDINATION | Age: 85
End: 2025-08-16
Payer: MEDICARE

## 2025-08-18 ENCOUNTER — PATIENT OUTREACH (OUTPATIENT)
Dept: CARE COORDINATION | Age: 85
End: 2025-08-18
Payer: MEDICARE

## 2025-08-18 VITALS — DIASTOLIC BLOOD PRESSURE: 72 MMHG | HEART RATE: 63 BPM | SYSTOLIC BLOOD PRESSURE: 140 MMHG

## 2025-08-20 ENCOUNTER — PATIENT OUTREACH (OUTPATIENT)
Dept: CARE COORDINATION | Age: 85
End: 2025-08-20
Payer: MEDICARE

## 2025-08-20 ENCOUNTER — PATIENT OUTREACH (OUTPATIENT)
Dept: CARE COORDINATION | Facility: CLINIC | Age: 85
End: 2025-08-20
Payer: MEDICARE

## 2025-08-20 VITALS — DIASTOLIC BLOOD PRESSURE: 71 MMHG | HEART RATE: 61 BPM | SYSTOLIC BLOOD PRESSURE: 145 MMHG

## 2025-08-22 ENCOUNTER — PATIENT OUTREACH (OUTPATIENT)
Dept: CARE COORDINATION | Age: 85
End: 2025-08-22

## 2025-08-22 ENCOUNTER — APPOINTMENT (OUTPATIENT)
Dept: CARE COORDINATION | Age: 85
End: 2025-08-22
Payer: MEDICARE

## 2025-08-22 ENCOUNTER — APPOINTMENT (OUTPATIENT)
Dept: PHARMACY | Facility: HOSPITAL | Age: 85
End: 2025-08-22
Payer: MEDICARE

## 2025-08-22 VITALS — SYSTOLIC BLOOD PRESSURE: 151 MMHG | DIASTOLIC BLOOD PRESSURE: 82 MMHG | HEART RATE: 59 BPM

## 2025-08-22 DIAGNOSIS — G45.9 TIA (TRANSIENT ISCHEMIC ATTACK): Primary | ICD-10-CM

## 2025-08-22 DIAGNOSIS — I1A.0 RESISTANT HYPERTENSION: ICD-10-CM

## 2025-08-22 DIAGNOSIS — I63.212: ICD-10-CM

## 2025-08-27 ENCOUNTER — APPOINTMENT (OUTPATIENT)
Dept: PRIMARY CARE | Facility: CLINIC | Age: 85
End: 2025-08-27
Payer: MEDICARE

## 2025-09-05 ENCOUNTER — OFFICE VISIT (OUTPATIENT)
Age: 85
End: 2025-09-05
Payer: MEDICARE

## 2025-09-05 VITALS
DIASTOLIC BLOOD PRESSURE: 82 MMHG | HEIGHT: 68 IN | BODY MASS INDEX: 26.67 KG/M2 | TEMPERATURE: 97.6 F | SYSTOLIC BLOOD PRESSURE: 164 MMHG | RESPIRATION RATE: 20 BRPM | HEART RATE: 64 BPM | WEIGHT: 176 LBS | OXYGEN SATURATION: 97 %

## 2025-09-05 DIAGNOSIS — M70.32 BURSITIS OF LEFT ELBOW, UNSPECIFIED BURSA: Primary | ICD-10-CM

## 2025-09-05 RX ORDER — IBUPROFEN 800 MG/1
800 TABLET, FILM COATED ORAL EVERY 8 HOURS PRN
Qty: 30 TABLET | Refills: 0 | Status: SHIPPED | OUTPATIENT
Start: 2025-09-05

## 2025-09-05 ASSESSMENT — PATIENT HEALTH QUESTIONNAIRE - PHQ9
SUM OF ALL RESPONSES TO PHQ9 QUESTIONS 1 AND 2: 0
2. FEELING DOWN, DEPRESSED OR HOPELESS: NOT AT ALL
1. LITTLE INTEREST OR PLEASURE IN DOING THINGS: NOT AT ALL

## 2025-10-22 ENCOUNTER — APPOINTMENT (OUTPATIENT)
Dept: CARDIOLOGY | Facility: CLINIC | Age: 85
End: 2025-10-22
Payer: MEDICARE

## 2026-02-11 ENCOUNTER — APPOINTMENT (OUTPATIENT)
Dept: PRIMARY CARE | Facility: CLINIC | Age: 86
End: 2026-02-11
Payer: MEDICARE